# Patient Record
Sex: FEMALE | Race: WHITE | NOT HISPANIC OR LATINO
[De-identification: names, ages, dates, MRNs, and addresses within clinical notes are randomized per-mention and may not be internally consistent; named-entity substitution may affect disease eponyms.]

---

## 2021-10-15 ENCOUNTER — APPOINTMENT (OUTPATIENT)
Dept: FAMILY MEDICINE | Facility: CLINIC | Age: 31
End: 2021-10-15
Payer: COMMERCIAL

## 2021-10-15 VITALS
BODY MASS INDEX: 32.62 KG/M2 | TEMPERATURE: 98.2 F | HEART RATE: 103 BPM | WEIGHT: 203 LBS | SYSTOLIC BLOOD PRESSURE: 100 MMHG | OXYGEN SATURATION: 98 % | DIASTOLIC BLOOD PRESSURE: 52 MMHG | HEIGHT: 66 IN

## 2021-10-15 PROCEDURE — 99214 OFFICE O/P EST MOD 30 MIN: CPT | Mod: 25

## 2021-10-15 NOTE — HISTORY OF PRESENT ILLNESS
[FreeTextEntry1] : I UG [de-identified] : 30-year-old female  2 para 1 has a home positive pregnancy test

## 2021-10-15 NOTE — HEALTH RISK ASSESSMENT
[0] : 2) Feeling down, depressed, or hopeless: Not at all (0) [PHQ-2 Negative - No further assessment needed] : PHQ-2 Negative - No further assessment needed [[id number]] : 0

## 2021-10-15 NOTE — PLAN
[FreeTextEntry1] : 30-year-old female presents with presumptive pregnancy test.  She had positive readings at home\par  2 para 1\par A pregnancy urine test is obtained\par Overweight–diet and exercise are stressed during this in coming pregnancy\par Depression screen is read as negative

## 2021-10-16 ENCOUNTER — TRANSCRIPTION ENCOUNTER (OUTPATIENT)
Age: 31
End: 2021-10-16

## 2021-10-16 ENCOUNTER — APPOINTMENT (OUTPATIENT)
Dept: OBGYN | Facility: CLINIC | Age: 31
End: 2021-10-16
Payer: COMMERCIAL

## 2021-10-16 VITALS
BODY MASS INDEX: 32.4 KG/M2 | SYSTOLIC BLOOD PRESSURE: 110 MMHG | WEIGHT: 204 LBS | DIASTOLIC BLOOD PRESSURE: 78 MMHG | HEIGHT: 66.5 IN

## 2021-10-16 DIAGNOSIS — Z86.59 PERSONAL HISTORY OF OTHER MENTAL AND BEHAVIORAL DISORDERS: ICD-10-CM

## 2021-10-16 DIAGNOSIS — Z32.01 ENCOUNTER FOR PREGNANCY TEST, RESULT POSITIVE: ICD-10-CM

## 2021-10-16 PROCEDURE — 76830 TRANSVAGINAL US NON-OB: CPT

## 2021-10-16 PROCEDURE — 99204 OFFICE O/P NEW MOD 45 MIN: CPT

## 2021-10-16 NOTE — PLAN
[FreeTextEntry1] : pregnancy precautions reviewed. taking pnv. f/u 2 weeks for repeat sono or prn\par tvs- thickened endometrial lining

## 2021-10-18 LAB — HCG SERPL-MCNC: 39 MIU/ML

## 2021-10-28 ENCOUNTER — APPOINTMENT (OUTPATIENT)
Dept: OBGYN | Facility: CLINIC | Age: 31
End: 2021-10-28
Payer: COMMERCIAL

## 2021-10-28 VITALS
WEIGHT: 210 LBS | SYSTOLIC BLOOD PRESSURE: 155 MMHG | DIASTOLIC BLOOD PRESSURE: 85 MMHG | HEIGHT: 66.5 IN | BODY MASS INDEX: 33.35 KG/M2

## 2021-10-28 DIAGNOSIS — O21.9 VOMITING OF PREGNANCY, UNSPECIFIED: ICD-10-CM

## 2021-10-28 PROCEDURE — 99214 OFFICE O/P EST MOD 30 MIN: CPT

## 2021-10-28 PROCEDURE — 76830 TRANSVAGINAL US NON-OB: CPT

## 2021-10-28 NOTE — PLAN
[FreeTextEntry1] : pregnancy precautions reviewed\par dietary changes discussed\par f/u 1 week or prn\par tvs- ys/gs seen

## 2021-11-03 ENCOUNTER — EMERGENCY (EMERGENCY)
Facility: HOSPITAL | Age: 31
LOS: 1 days | End: 2021-11-03
Admitting: EMERGENCY MEDICINE
Payer: COMMERCIAL

## 2021-11-03 PROCEDURE — 93010 ELECTROCARDIOGRAM REPORT: CPT

## 2021-11-03 PROCEDURE — 99285 EMERGENCY DEPT VISIT HI MDM: CPT

## 2021-11-04 ENCOUNTER — APPOINTMENT (OUTPATIENT)
Dept: OBGYN | Facility: CLINIC | Age: 31
End: 2021-11-04
Payer: COMMERCIAL

## 2021-11-04 VITALS
BODY MASS INDEX: 33.75 KG/M2 | WEIGHT: 210 LBS | OXYGEN SATURATION: 98 % | DIASTOLIC BLOOD PRESSURE: 84 MMHG | RESPIRATION RATE: 18 BRPM | SYSTOLIC BLOOD PRESSURE: 133 MMHG | HEIGHT: 66 IN | HEART RATE: 101 BPM

## 2021-11-04 DIAGNOSIS — N92.6 IRREGULAR MENSTRUATION, UNSPECIFIED: ICD-10-CM

## 2021-11-04 PROCEDURE — 99214 OFFICE O/P EST MOD 30 MIN: CPT

## 2021-11-04 PROCEDURE — 76830 TRANSVAGINAL US NON-OB: CPT

## 2021-11-04 NOTE — PLAN
[FreeTextEntry1] : pregnancy precautions reviewed. taking pnv. f/u 4 weeks for nob or prn\par tvs- viable iup,+fh, 6+ weeks

## 2021-11-04 NOTE — HISTORY OF PRESENT ILLNESS
[FreeTextEntry1] : pt presents for f/u to missed period. was seen in er yesterday for "irregular heartbeat ad elevated bp" on 14yo bp cuff. pt had negative w/u and feels fine today

## 2021-12-01 ENCOUNTER — APPOINTMENT (OUTPATIENT)
Dept: ANTEPARTUM | Facility: CLINIC | Age: 31
End: 2021-12-01

## 2021-12-01 ENCOUNTER — APPOINTMENT (OUTPATIENT)
Dept: OBGYN | Facility: CLINIC | Age: 31
End: 2021-12-01

## 2021-12-07 ENCOUNTER — APPOINTMENT (OUTPATIENT)
Dept: OBGYN | Facility: CLINIC | Age: 31
End: 2021-12-07
Payer: COMMERCIAL

## 2021-12-07 VITALS
BODY MASS INDEX: 33.11 KG/M2 | DIASTOLIC BLOOD PRESSURE: 76 MMHG | WEIGHT: 206 LBS | HEIGHT: 66 IN | SYSTOLIC BLOOD PRESSURE: 118 MMHG

## 2021-12-07 PROCEDURE — 36415 COLL VENOUS BLD VENIPUNCTURE: CPT

## 2021-12-17 LAB
CLARIM 22Q11.2: NORMAL
CLARIM ADDITIONAL INFO: NORMAL
CLARIM CHROMOSOME 13: NORMAL
CLARIM CHROMOSOME 18: NORMAL
CLARIM CHROMOSOME 21: NORMAL
CLARIM SEX CHROMOSOMES: NORMAL
CLARITEST NIPT W/MICRO: NORMAL
MATERNAL WEIGHT (LBS):: 206
PLEASE INCLUDE GENDER RESULTS ON THIS REPORT:: NORMAL
TYPE OF PREGNANCY:: NORMAL

## 2021-12-21 ENCOUNTER — APPOINTMENT (OUTPATIENT)
Dept: ANTEPARTUM | Facility: CLINIC | Age: 31
End: 2021-12-21
Payer: COMMERCIAL

## 2021-12-21 ENCOUNTER — APPOINTMENT (OUTPATIENT)
Dept: OBGYN | Facility: CLINIC | Age: 31
End: 2021-12-21
Payer: COMMERCIAL

## 2021-12-21 ENCOUNTER — ASOB RESULT (OUTPATIENT)
Age: 31
End: 2021-12-21

## 2021-12-21 VITALS
DIASTOLIC BLOOD PRESSURE: 86 MMHG | BODY MASS INDEX: 32.47 KG/M2 | HEIGHT: 66 IN | WEIGHT: 202 LBS | SYSTOLIC BLOOD PRESSURE: 139 MMHG

## 2021-12-21 DIAGNOSIS — Z34.91 ENCOUNTER FOR SUPERVISION OF NORMAL PREGNANCY, UNSPECIFIED, FIRST TRIMESTER: ICD-10-CM

## 2021-12-21 PROCEDURE — 0500F INITIAL PRENATAL CARE VISIT: CPT

## 2021-12-21 PROCEDURE — 36416 COLLJ CAPILLARY BLOOD SPEC: CPT

## 2021-12-21 PROCEDURE — 76813 OB US NUCHAL MEAS 1 GEST: CPT

## 2021-12-22 LAB
ABO + RH PNL BLD: NORMAL
BASOPHILS # BLD AUTO: 0.02 K/UL
BASOPHILS NFR BLD AUTO: 0.2 %
BLD GP AB SCN SERPL QL: NORMAL
EOSINOPHIL # BLD AUTO: 0.09 K/UL
EOSINOPHIL NFR BLD AUTO: 0.9 %
HBV SURFACE AB SER QL: REACTIVE
HBV SURFACE AG SER QL: NONREACTIVE
HCT VFR BLD CALC: 34.1 %
HCV AB SER QL: NONREACTIVE
HCV S/CO RATIO: 0.38 S/CO
HGB A MFR BLD: 97.3 %
HGB A2 MFR BLD: 2.7 %
HGB BLD-MCNC: 11.8 G/DL
HGB FRACT BLD-IMP: NORMAL
HIV1+2 AB SPEC QL IA.RAPID: NONREACTIVE
IMM GRANULOCYTES NFR BLD AUTO: 0.5 %
LYMPHOCYTES # BLD AUTO: 1.3 K/UL
LYMPHOCYTES NFR BLD AUTO: 13.6 %
MAN DIFF?: NORMAL
MCHC RBC-ENTMCNC: 30.7 PG
MCHC RBC-ENTMCNC: 34.6 GM/DL
MCV RBC AUTO: 88.8 FL
MEV IGG FLD QL IA: 98.3 AU/ML
MEV IGG+IGM SER-IMP: POSITIVE
MONOCYTES # BLD AUTO: 0.64 K/UL
MONOCYTES NFR BLD AUTO: 6.7 %
NEUTROPHILS # BLD AUTO: 7.48 K/UL
NEUTROPHILS NFR BLD AUTO: 78.1 %
PLATELET # BLD AUTO: 229 K/UL
RBC # BLD: 3.84 M/UL
RBC # FLD: 11.9 %
RUBV IGG FLD-ACNC: 2 INDEX
RUBV IGG SER-IMP: POSITIVE
T GONDII AB SER-IMP: NEGATIVE
T GONDII AB SER-IMP: NEGATIVE
T GONDII IGG SER QL: <3 IU/ML
T GONDII IGM SER QL: 3.1 AU/ML
T PALLIDUM AB SER QL IA: NEGATIVE
VZV AB TITR SER: POSITIVE
VZV IGG SER IF-ACNC: 1351 INDEX
WBC # FLD AUTO: 9.58 K/UL

## 2021-12-23 LAB
B19V IGG SER QL IA: 1.44 INDEX
B19V IGG+IGM SER-IMP: NORMAL
B19V IGG+IGM SER-IMP: POSITIVE
B19V IGM FLD-ACNC: 0.09 INDEX
B19V IGM SER-ACNC: NEGATIVE

## 2022-01-11 ENCOUNTER — APPOINTMENT (OUTPATIENT)
Dept: OBGYN | Facility: CLINIC | Age: 32
End: 2022-01-11
Payer: COMMERCIAL

## 2022-01-11 VITALS
SYSTOLIC BLOOD PRESSURE: 146 MMHG | HEIGHT: 66 IN | BODY MASS INDEX: 32.47 KG/M2 | DIASTOLIC BLOOD PRESSURE: 82 MMHG | WEIGHT: 202 LBS

## 2022-01-11 DIAGNOSIS — Z34.92 ENCOUNTER FOR SUPERVISION OF NORMAL PREGNANCY, UNSPECIFIED, SECOND TRIMESTER: ICD-10-CM

## 2022-01-11 PROCEDURE — 0502F SUBSEQUENT PRENATAL CARE: CPT

## 2022-01-12 ENCOUNTER — NON-APPOINTMENT (OUTPATIENT)
Age: 32
End: 2022-01-12

## 2022-01-12 ENCOUNTER — APPOINTMENT (OUTPATIENT)
Dept: OBGYN | Facility: CLINIC | Age: 32
End: 2022-01-12
Payer: COMMERCIAL

## 2022-01-12 VITALS
HEIGHT: 66 IN | SYSTOLIC BLOOD PRESSURE: 124 MMHG | WEIGHT: 200 LBS | DIASTOLIC BLOOD PRESSURE: 60 MMHG | BODY MASS INDEX: 32.14 KG/M2

## 2022-01-12 DIAGNOSIS — Z29.13 ENCOUNTER FOR PROPHYLACTIC RHO(D) IMMUNE GLOBULIN: ICD-10-CM

## 2022-01-12 LAB
AR GENE MUT ANL BLD/T: NEGATIVE
CANDIDA VAG CYTO: DETECTED
CFTR MUT TESTED BLD/T: NEGATIVE
G VAGINALIS+PREV SP MTYP VAG QL MICRO: DETECTED
GENE DIS ANL CARRIER INTERP-IMP: POSITIVE
SMN1 GENE MUT ANL BLD/T: NORMAL
T VAGINALIS VAG QL WET PREP: NOT DETECTED

## 2022-01-12 PROCEDURE — 0502F SUBSEQUENT PRENATAL CARE: CPT

## 2022-01-12 PROCEDURE — 96372 THER/PROPH/DIAG INJ SC/IM: CPT

## 2022-01-12 PROCEDURE — 76705 ECHO EXAM OF ABDOMEN: CPT

## 2022-01-12 RX ADMIN — HUMAN RHO(D) IMMUNE GLOBULIN 0 UNIT: 300 INJECTION, SOLUTION INTRAMUSCULAR at 00:00

## 2022-01-13 ENCOUNTER — APPOINTMENT (OUTPATIENT)
Dept: OBGYN | Facility: CLINIC | Age: 32
End: 2022-01-13
Payer: COMMERCIAL

## 2022-01-13 VITALS
BODY MASS INDEX: 32.14 KG/M2 | OXYGEN SATURATION: 98 % | HEIGHT: 66 IN | RESPIRATION RATE: 18 BRPM | WEIGHT: 200 LBS | SYSTOLIC BLOOD PRESSURE: 141 MMHG | DIASTOLIC BLOOD PRESSURE: 81 MMHG

## 2022-01-13 PROCEDURE — 0502F SUBSEQUENT PRENATAL CARE: CPT

## 2022-01-14 LAB
2ND TRIMESTER DATA: NORMAL
AFP PNL SERPL: NORMAL
AFP SERPL-ACNC: NORMAL
CLINICAL BIOCHEMIST REVIEW: NORMAL
NOTES NTD: NORMAL

## 2022-01-18 ENCOUNTER — APPOINTMENT (OUTPATIENT)
Dept: OBGYN | Facility: CLINIC | Age: 32
End: 2022-01-18
Payer: COMMERCIAL

## 2022-01-18 VITALS
BODY MASS INDEX: 33.43 KG/M2 | HEIGHT: 66 IN | DIASTOLIC BLOOD PRESSURE: 83 MMHG | SYSTOLIC BLOOD PRESSURE: 146 MMHG | RESPIRATION RATE: 19 BRPM | OXYGEN SATURATION: 98 % | WEIGHT: 208 LBS

## 2022-01-18 DIAGNOSIS — N76.0 ACUTE VAGINITIS: ICD-10-CM

## 2022-01-18 PROCEDURE — 0502F SUBSEQUENT PRENATAL CARE: CPT

## 2022-02-09 ENCOUNTER — APPOINTMENT (OUTPATIENT)
Dept: OBGYN | Facility: CLINIC | Age: 32
End: 2022-02-09
Payer: COMMERCIAL

## 2022-02-09 ENCOUNTER — APPOINTMENT (OUTPATIENT)
Dept: ANTEPARTUM | Facility: CLINIC | Age: 32
End: 2022-02-09
Payer: COMMERCIAL

## 2022-02-09 ENCOUNTER — ASOB RESULT (OUTPATIENT)
Age: 32
End: 2022-02-09

## 2022-02-09 VITALS
HEART RATE: 88 BPM | SYSTOLIC BLOOD PRESSURE: 126 MMHG | RESPIRATION RATE: 18 BRPM | WEIGHT: 198 LBS | HEIGHT: 66 IN | BODY MASS INDEX: 31.82 KG/M2 | DIASTOLIC BLOOD PRESSURE: 68 MMHG

## 2022-02-09 VITALS — TEMPERATURE: 98.4 F

## 2022-02-09 PROCEDURE — 90715 TDAP VACCINE 7 YRS/> IM: CPT

## 2022-02-09 PROCEDURE — 90471 IMMUNIZATION ADMIN: CPT

## 2022-02-09 PROCEDURE — 76811 OB US DETAILED SNGL FETUS: CPT

## 2022-02-09 PROCEDURE — 0502F SUBSEQUENT PRENATAL CARE: CPT

## 2022-03-08 LAB
BILIRUB UR QL STRIP: NORMAL
CLARITY UR: CLEAR
COLLECTION METHOD: NORMAL
GLUCOSE UR-MCNC: NORMAL
HCG UR QL: 0.2 EU/DL
HGB UR QL STRIP.AUTO: NORMAL
KETONES UR-MCNC: NORMAL
LEUKOCYTE ESTERASE UR QL STRIP: NORMAL
NITRITE UR QL STRIP: NORMAL
PH UR STRIP: 6
PROT UR STRIP-MCNC: NORMAL
SP GR UR STRIP: 1.03

## 2022-03-10 ENCOUNTER — APPOINTMENT (OUTPATIENT)
Dept: OBGYN | Facility: CLINIC | Age: 32
End: 2022-03-10
Payer: COMMERCIAL

## 2022-03-10 VITALS
HEIGHT: 67 IN | WEIGHT: 201 LBS | SYSTOLIC BLOOD PRESSURE: 140 MMHG | DIASTOLIC BLOOD PRESSURE: 78 MMHG | BODY MASS INDEX: 31.55 KG/M2

## 2022-03-10 PROCEDURE — 0502F SUBSEQUENT PRENATAL CARE: CPT

## 2022-04-06 ENCOUNTER — ASOB RESULT (OUTPATIENT)
Age: 32
End: 2022-04-06

## 2022-04-06 ENCOUNTER — APPOINTMENT (OUTPATIENT)
Dept: OBGYN | Facility: CLINIC | Age: 32
End: 2022-04-06
Payer: COMMERCIAL

## 2022-04-06 ENCOUNTER — APPOINTMENT (OUTPATIENT)
Dept: ANTEPARTUM | Facility: CLINIC | Age: 32
End: 2022-04-06
Payer: COMMERCIAL

## 2022-04-06 ENCOUNTER — LABORATORY RESULT (OUTPATIENT)
Age: 32
End: 2022-04-06

## 2022-04-06 VITALS
DIASTOLIC BLOOD PRESSURE: 74 MMHG | HEIGHT: 67 IN | WEIGHT: 204 LBS | BODY MASS INDEX: 32.02 KG/M2 | SYSTOLIC BLOOD PRESSURE: 114 MMHG

## 2022-04-06 PROCEDURE — 36415 COLL VENOUS BLD VENIPUNCTURE: CPT

## 2022-04-06 PROCEDURE — 76816 OB US FOLLOW-UP PER FETUS: CPT

## 2022-04-06 PROCEDURE — 0502F SUBSEQUENT PRENATAL CARE: CPT

## 2022-04-08 LAB
BASOPHILS # BLD AUTO: 0.03 K/UL
BASOPHILS NFR BLD AUTO: 0.2 %
EOSINOPHIL # BLD AUTO: 0.17 K/UL
EOSINOPHIL NFR BLD AUTO: 1.3 %
GLUCOSE 1H P 100 G GLC PO SERPL-MCNC: 169 MG/DL
HCT VFR BLD CALC: 33 %
HGB BLD-MCNC: 10.7 G/DL
IMM GRANULOCYTES NFR BLD AUTO: 0.5 %
LYMPHOCYTES # BLD AUTO: 1.87 K/UL
LYMPHOCYTES NFR BLD AUTO: 14.6 %
MAN DIFF?: NORMAL
MCHC RBC-ENTMCNC: 30.5 PG
MCHC RBC-ENTMCNC: 32.4 GM/DL
MCV RBC AUTO: 94 FL
MONOCYTES # BLD AUTO: 0.69 K/UL
MONOCYTES NFR BLD AUTO: 5.4 %
NEUTROPHILS # BLD AUTO: 9.97 K/UL
NEUTROPHILS NFR BLD AUTO: 78 %
PLATELET # BLD AUTO: 281 K/UL
RBC # BLD: 3.51 M/UL
RBC # FLD: 13.1 %
WBC # FLD AUTO: 12.79 K/UL

## 2022-04-13 LAB
GLUCOSE 1H P 100 G GLC PO SERPL-MCNC: 137 MG/DL
GLUCOSE 2H P CHAL SERPL-MCNC: 124 MG/DL
GLUCOSE 3H P CHAL SERPL-MCNC: 47 MG/DL
GLUCOSE BS SERPL-MCNC: 77 MG/DL

## 2022-04-14 ENCOUNTER — NON-APPOINTMENT (OUTPATIENT)
Age: 32
End: 2022-04-14

## 2022-04-19 ENCOUNTER — NON-APPOINTMENT (OUTPATIENT)
Age: 32
End: 2022-04-19

## 2022-04-19 ENCOUNTER — APPOINTMENT (OUTPATIENT)
Dept: OBGYN | Facility: CLINIC | Age: 32
End: 2022-04-19
Payer: COMMERCIAL

## 2022-04-19 VITALS
BODY MASS INDEX: 31.71 KG/M2 | WEIGHT: 202 LBS | SYSTOLIC BLOOD PRESSURE: 130 MMHG | DIASTOLIC BLOOD PRESSURE: 70 MMHG | HEIGHT: 67 IN

## 2022-04-19 PROCEDURE — 0502F SUBSEQUENT PRENATAL CARE: CPT

## 2022-05-03 ENCOUNTER — APPOINTMENT (OUTPATIENT)
Dept: OBGYN | Facility: CLINIC | Age: 32
End: 2022-05-03
Payer: COMMERCIAL

## 2022-05-03 VITALS
BODY MASS INDEX: 31.86 KG/M2 | WEIGHT: 203 LBS | HEIGHT: 67 IN | SYSTOLIC BLOOD PRESSURE: 116 MMHG | DIASTOLIC BLOOD PRESSURE: 78 MMHG

## 2022-05-03 PROCEDURE — 0502F SUBSEQUENT PRENATAL CARE: CPT

## 2022-05-03 RX ORDER — HUMAN RHO(D) IMMUNE GLOBULIN 300 UG/1
1500 INJECTION, SOLUTION INTRAMUSCULAR
Qty: 0 | Refills: 0 | Status: COMPLETED | OUTPATIENT
Start: 2022-01-12

## 2022-05-17 ENCOUNTER — APPOINTMENT (OUTPATIENT)
Dept: OBGYN | Facility: CLINIC | Age: 32
End: 2022-05-17
Payer: COMMERCIAL

## 2022-05-17 VITALS
DIASTOLIC BLOOD PRESSURE: 74 MMHG | SYSTOLIC BLOOD PRESSURE: 122 MMHG | HEIGHT: 67 IN | BODY MASS INDEX: 31.23 KG/M2 | WEIGHT: 199 LBS

## 2022-05-17 PROCEDURE — 0502F SUBSEQUENT PRENATAL CARE: CPT

## 2022-05-25 ENCOUNTER — APPOINTMENT (OUTPATIENT)
Dept: ANTEPARTUM | Facility: CLINIC | Age: 32
End: 2022-05-25
Payer: COMMERCIAL

## 2022-05-25 ENCOUNTER — APPOINTMENT (OUTPATIENT)
Dept: OBGYN | Facility: CLINIC | Age: 32
End: 2022-05-25
Payer: COMMERCIAL

## 2022-05-25 ENCOUNTER — APPOINTMENT (OUTPATIENT)
Dept: ANTEPARTUM | Facility: CLINIC | Age: 32
End: 2022-05-25

## 2022-05-25 ENCOUNTER — ASOB RESULT (OUTPATIENT)
Age: 32
End: 2022-05-25

## 2022-05-25 VITALS
SYSTOLIC BLOOD PRESSURE: 128 MMHG | WEIGHT: 201 LBS | BODY MASS INDEX: 31.55 KG/M2 | HEIGHT: 67 IN | DIASTOLIC BLOOD PRESSURE: 68 MMHG

## 2022-05-25 DIAGNOSIS — Z34.93 ENCOUNTER FOR SUPERVISION OF NORMAL PREGNANCY, UNSPECIFIED, THIRD TRIMESTER: ICD-10-CM

## 2022-05-25 PROCEDURE — 76819 FETAL BIOPHYS PROFIL W/O NST: CPT

## 2022-05-25 PROCEDURE — 0502F SUBSEQUENT PRENATAL CARE: CPT

## 2022-05-25 PROCEDURE — 76816 OB US FOLLOW-UP PER FETUS: CPT

## 2022-05-31 ENCOUNTER — APPOINTMENT (OUTPATIENT)
Dept: OBGYN | Facility: CLINIC | Age: 32
End: 2022-05-31
Payer: COMMERCIAL

## 2022-05-31 VITALS
SYSTOLIC BLOOD PRESSURE: 120 MMHG | DIASTOLIC BLOOD PRESSURE: 84 MMHG | BODY MASS INDEX: 31.55 KG/M2 | WEIGHT: 201 LBS | HEIGHT: 67 IN

## 2022-05-31 LAB
GP B STREP DNA SPEC QL NAA+PROBE: NORMAL
GP B STREP DNA SPEC QL NAA+PROBE: NOT DETECTED
SOURCE GBS: NORMAL

## 2022-05-31 PROCEDURE — 0502F SUBSEQUENT PRENATAL CARE: CPT

## 2022-06-07 ENCOUNTER — APPOINTMENT (OUTPATIENT)
Dept: OBGYN | Facility: CLINIC | Age: 32
End: 2022-06-07
Payer: COMMERCIAL

## 2022-06-07 VITALS
WEIGHT: 201 LBS | HEIGHT: 67 IN | BODY MASS INDEX: 31.55 KG/M2 | SYSTOLIC BLOOD PRESSURE: 130 MMHG | DIASTOLIC BLOOD PRESSURE: 78 MMHG

## 2022-06-07 PROCEDURE — 0502F SUBSEQUENT PRENATAL CARE: CPT

## 2022-06-14 ENCOUNTER — APPOINTMENT (OUTPATIENT)
Dept: OBGYN | Facility: CLINIC | Age: 32
End: 2022-06-14

## 2022-06-14 VITALS
BODY MASS INDEX: 31.55 KG/M2 | DIASTOLIC BLOOD PRESSURE: 90 MMHG | WEIGHT: 201 LBS | HEIGHT: 67 IN | SYSTOLIC BLOOD PRESSURE: 130 MMHG

## 2022-06-20 ENCOUNTER — APPOINTMENT (OUTPATIENT)
Dept: OBGYN | Facility: HOSPITAL | Age: 32
End: 2022-06-20

## 2022-06-20 ENCOUNTER — INPATIENT (INPATIENT)
Facility: HOSPITAL | Age: 32
LOS: 0 days | Discharge: HOME | End: 2022-06-21
Attending: OBSTETRICS & GYNECOLOGY | Admitting: OBSTETRICS & GYNECOLOGY
Payer: COMMERCIAL

## 2022-06-20 PROCEDURE — 59400 OBSTETRICAL CARE: CPT

## 2022-06-20 PROCEDURE — 88307 TISSUE EXAM BY PATHOLOGIST: CPT | Mod: 26

## 2022-06-30 DIAGNOSIS — Z20.822 CONTACT WITH AND (SUSPECTED) EXPOSURE TO COVID-19: ICD-10-CM

## 2022-06-30 DIAGNOSIS — Z3A.39 39 WEEKS GESTATION OF PREGNANCY: ICD-10-CM

## 2022-07-06 ENCOUNTER — NON-APPOINTMENT (OUTPATIENT)
Age: 32
End: 2022-07-06

## 2022-08-04 ENCOUNTER — APPOINTMENT (OUTPATIENT)
Dept: OBGYN | Facility: CLINIC | Age: 32
End: 2022-08-04

## 2022-08-04 VITALS
WEIGHT: 187 LBS | DIASTOLIC BLOOD PRESSURE: 82 MMHG | HEIGHT: 67 IN | BODY MASS INDEX: 29.35 KG/M2 | SYSTOLIC BLOOD PRESSURE: 122 MMHG

## 2022-08-04 NOTE — HISTORY OF PRESENT ILLNESS
[Postpartum Follow Up] : postpartum follow up [Complications:___] : no complications [] : delivered by vaginal delivery [Male] : Delivery History: baby boy [Rhogam] : Rhogam administered [Breastfeeding] : not currently nursing [Intended Contraception] : Intended Contraception: [IUD] : intrauterine device [Back to Normal] : is back to normal in size [None] : no vaginal bleeding [Not Done] : Examination of breasts not done [Doing Well] : is doing well [No Sign of Infection] : is showing no signs of infection [Excellent Pain Control] : has excellent pain control [de-identified] : pt requesting iud. r/b/a advised. will schedule. f/u 2 mos for annual

## 2022-08-15 ENCOUNTER — NON-APPOINTMENT (OUTPATIENT)
Age: 32
End: 2022-08-15

## 2022-08-19 ENCOUNTER — APPOINTMENT (OUTPATIENT)
Dept: FAMILY MEDICINE | Facility: CLINIC | Age: 32
End: 2022-08-19

## 2022-08-19 ENCOUNTER — RESULT CHARGE (OUTPATIENT)
Age: 32
End: 2022-08-19

## 2022-08-19 ENCOUNTER — NON-APPOINTMENT (OUTPATIENT)
Age: 32
End: 2022-08-19

## 2022-08-19 VITALS
WEIGHT: 192 LBS | DIASTOLIC BLOOD PRESSURE: 78 MMHG | BODY MASS INDEX: 30.13 KG/M2 | OXYGEN SATURATION: 98 % | HEIGHT: 67 IN | SYSTOLIC BLOOD PRESSURE: 120 MMHG | HEART RATE: 92 BPM | RESPIRATION RATE: 15 BRPM | TEMPERATURE: 97.3 F

## 2022-08-19 VITALS
OXYGEN SATURATION: 98 % | DIASTOLIC BLOOD PRESSURE: 78 MMHG | HEIGHT: 67 IN | HEART RATE: 92 BPM | TEMPERATURE: 97.3 F | SYSTOLIC BLOOD PRESSURE: 120 MMHG | BODY MASS INDEX: 30.13 KG/M2 | WEIGHT: 192 LBS | RESPIRATION RATE: 15 BRPM

## 2022-08-19 DIAGNOSIS — Z33.1 PREGNANT STATE, INCIDENTAL: ICD-10-CM

## 2022-08-19 DIAGNOSIS — G47.9 SLEEP DISORDER, UNSPECIFIED: ICD-10-CM

## 2022-08-19 PROCEDURE — 93000 ELECTROCARDIOGRAM COMPLETE: CPT

## 2022-08-19 PROCEDURE — 99395 PREV VISIT EST AGE 18-39: CPT | Mod: 25

## 2022-08-19 PROCEDURE — 99406 BEHAV CHNG SMOKING 3-10 MIN: CPT

## 2022-08-19 PROCEDURE — 81003 URINALYSIS AUTO W/O SCOPE: CPT | Mod: QW

## 2022-08-24 PROBLEM — G47.9 SLEEP DISORDER: Status: ACTIVE | Noted: 2022-08-24

## 2022-08-24 NOTE — PLAN
[FreeTextEntry1] : 31-year-old female presents for physical examination\par /social drinker\par Tobacco user–postpartum continues to smoke.  Not breast-feeding.  She understands the importance of cessation from tobacco and is willing to taper\par Sleep disorder– baby having trouble with child sleep pattern is affecting her secondarily\par Postpartum–doing well postpartum lab work is drawn for complete examination\par Major depressive disorder–has been on antianxiolytic medication in the past not currently on medication\par EKG shows normal sinus rhythm without incident\par Lab work urinalysis are drawn

## 2022-08-24 NOTE — HISTORY OF PRESENT ILLNESS
[FreeTextEntry1] : physical exam  [de-identified] : sleeping issues \par tob + 1/2 \par no breast feeding \par lexapro \par klonipramine \par xanax

## 2022-08-24 NOTE — COUNSELING
[Behavioral health counseling provided] : Behavioral health counseling provided [FreeTextEntry1] : 4

## 2022-08-24 NOTE — HEALTH RISK ASSESSMENT
[0] : 2) Feeling down, depressed, or hopeless: Not at all (0) [PHQ-2 Negative - No further assessment needed] : PHQ-2 Negative - No further assessment needed [[id number]] : 0

## 2022-08-25 LAB
ALBUMIN SERPL ELPH-MCNC: 4.6 G/DL
ALP BLD-CCNC: 78 U/L
ALT SERPL-CCNC: 15 U/L
ANION GAP SERPL CALC-SCNC: 12 MMOL/L
AST SERPL-CCNC: 15 U/L
BASOPHILS # BLD AUTO: 0.03 K/UL
BASOPHILS NFR BLD AUTO: 0.3 %
BILIRUB SERPL-MCNC: 0.3 MG/DL
BILIRUB UR QL STRIP: NEGATIVE
BUN SERPL-MCNC: 12 MG/DL
CALCIUM SERPL-MCNC: 9.6 MG/DL
CHLORIDE SERPL-SCNC: 104 MMOL/L
CHOLEST SERPL-MCNC: 220 MG/DL
CLARITY UR: CLEAR
CO2 SERPL-SCNC: 23 MMOL/L
COLLECTION METHOD: NORMAL
CREAT SERPL-MCNC: 0.8 MG/DL
EGFR: 100 ML/MIN/1.73M2
EOSINOPHIL # BLD AUTO: 0.28 K/UL
EOSINOPHIL NFR BLD AUTO: 2.4 %
ESTIMATED AVERAGE GLUCOSE: 103 MG/DL
FERRITIN SERPL-MCNC: 14 NG/ML
GLUCOSE SERPL-MCNC: 89 MG/DL
GLUCOSE UR-MCNC: NEGATIVE
HBA1C MFR BLD HPLC: 5.2 %
HCG UR QL: 0.2 EU/DL
HCT VFR BLD CALC: 39.7 %
HDLC SERPL-MCNC: 52 MG/DL
HGB BLD-MCNC: 12.7 G/DL
HGB UR QL STRIP.AUTO: NORMAL
IMM GRANULOCYTES NFR BLD AUTO: 0.3 %
IRON SATN MFR SERPL: 17 %
IRON SERPL-MCNC: 60 UG/DL
KETONES UR-MCNC: NEGATIVE
LDLC SERPL CALC-MCNC: 144 MG/DL
LEUKOCYTE ESTERASE UR QL STRIP: NEGATIVE
LYMPHOCYTES # BLD AUTO: 2.29 K/UL
LYMPHOCYTES NFR BLD AUTO: 19.9 %
MAN DIFF?: NORMAL
MCHC RBC-ENTMCNC: 29.8 PG
MCHC RBC-ENTMCNC: 32 GM/DL
MCV RBC AUTO: 93.2 FL
MONOCYTES # BLD AUTO: 0.64 K/UL
MONOCYTES NFR BLD AUTO: 5.6 %
NEUTROPHILS # BLD AUTO: 8.24 K/UL
NEUTROPHILS NFR BLD AUTO: 71.5 %
NITRITE UR QL STRIP: NEGATIVE
NONHDLC SERPL-MCNC: 168 MG/DL
PH UR STRIP: 7
PLATELET # BLD AUTO: 346 K/UL
POTASSIUM SERPL-SCNC: 4.7 MMOL/L
PROT SERPL-MCNC: 7 G/DL
PROT UR STRIP-MCNC: NEGATIVE
RBC # BLD: 4.26 M/UL
RBC # FLD: 13.9 %
SODIUM SERPL-SCNC: 140 MMOL/L
SP GR UR STRIP: 1.01
TIBC SERPL-MCNC: 359 UG/DL
TRIGL SERPL-MCNC: 122 MG/DL
TSH SERPL-ACNC: 1.38 UIU/ML
UIBC SERPL-MCNC: 299 UG/DL
WBC # FLD AUTO: 11.51 K/UL

## 2022-12-02 ENCOUNTER — RESULT CHARGE (OUTPATIENT)
Age: 32
End: 2022-12-02

## 2022-12-02 ENCOUNTER — APPOINTMENT (OUTPATIENT)
Dept: FAMILY MEDICINE | Facility: CLINIC | Age: 32
End: 2022-12-02
Payer: SELF-PAY

## 2022-12-02 VITALS
BODY MASS INDEX: 31.71 KG/M2 | TEMPERATURE: 97.8 F | HEART RATE: 112 BPM | OXYGEN SATURATION: 98 % | RESPIRATION RATE: 15 BRPM | SYSTOLIC BLOOD PRESSURE: 110 MMHG | HEIGHT: 67 IN | WEIGHT: 202 LBS | DIASTOLIC BLOOD PRESSURE: 60 MMHG

## 2022-12-02 DIAGNOSIS — U07.1 COVID-19: ICD-10-CM

## 2022-12-02 DIAGNOSIS — J31.0 CHRONIC RHINITIS: ICD-10-CM

## 2022-12-02 DIAGNOSIS — J32.9 CHRONIC RHINITIS: ICD-10-CM

## 2022-12-02 PROCEDURE — 99214 OFFICE O/P EST MOD 30 MIN: CPT | Mod: 25

## 2022-12-02 PROCEDURE — 87880 STREP A ASSAY W/OPTIC: CPT | Mod: QW

## 2022-12-02 RX ORDER — ALPRAZOLAM 0.25 MG/1
0.25 TABLET ORAL
Qty: 30 | Refills: 0 | Status: ACTIVE | COMMUNITY
Start: 2022-11-07

## 2022-12-02 RX ORDER — ESCITALOPRAM OXALATE 20 MG/1
20 TABLET ORAL
Qty: 90 | Refills: 0 | Status: ACTIVE | COMMUNITY
Start: 2022-10-26

## 2022-12-02 RX ORDER — CLOMIPRAMINE HYDROCHLORIDE 25 MG/1
25 CAPSULE ORAL
Qty: 30 | Refills: 0 | Status: ACTIVE | COMMUNITY
Start: 2022-09-20

## 2022-12-03 NOTE — HEALTH RISK ASSESSMENT
[0] : 2) Feeling down, depressed, or hopeless: Not at all (0) [PHQ-2 Negative - No further assessment needed] : PHQ-2 Negative - No further assessment needed [[id number]] : 0

## 2022-12-03 NOTE — PLAN
[FreeTextEntry1] : 32-year-old female presents for evaluation\par COVID-19–she tested positive for COVID she has had 6 days of upper respiratory symptoms.  Slight improvement\par Rhinosinusitis–her symptoms have migrated to the throat and sinus region complaining of a sore throat and cough.  Her rapid strep test is negative\par A throat culture is sent out\par Empiric doxycycline to 100 mg twice daily for 10, along with Tessalon Perles as ordered\par Major depressive disorder–she is done well with Xanax 0.25 as needed and Lexapro 20 mg daily

## 2022-12-05 LAB
BACTERIA THROAT CULT: NORMAL
S PYO AG SPEC QL IA: NEGATIVE

## 2022-12-30 ENCOUNTER — APPOINTMENT (OUTPATIENT)
Dept: FAMILY MEDICINE | Facility: CLINIC | Age: 32
End: 2022-12-30
Payer: COMMERCIAL

## 2022-12-30 VITALS
WEIGHT: 202 LBS | DIASTOLIC BLOOD PRESSURE: 84 MMHG | OXYGEN SATURATION: 99 % | HEIGHT: 67 IN | HEART RATE: 117 BPM | SYSTOLIC BLOOD PRESSURE: 122 MMHG | BODY MASS INDEX: 31.71 KG/M2 | TEMPERATURE: 95.8 F

## 2022-12-30 DIAGNOSIS — F32.9 MAJOR DEPRESSIVE DISORDER, SINGLE EPISODE, UNSPECIFIED: ICD-10-CM

## 2022-12-30 DIAGNOSIS — F41.9 ANXIETY DISORDER, UNSPECIFIED: ICD-10-CM

## 2022-12-30 PROCEDURE — 99214 OFFICE O/P EST MOD 30 MIN: CPT

## 2022-12-31 ENCOUNTER — RX CHANGE (OUTPATIENT)
Age: 32
End: 2022-12-31

## 2022-12-31 RX ORDER — TIRZEPATIDE 2.5 MG/.5ML
2.5 INJECTION, SOLUTION SUBCUTANEOUS WEEKLY
Qty: 6.5 | Refills: 0 | Status: DISCONTINUED | COMMUNITY
Start: 2022-12-30 | End: 2022-12-31

## 2023-01-15 NOTE — PLAN
. [FreeTextEntry1] : 32-year-old female presents for evaluation\par 20 pound weight gain\par Weight gain–admits to 20 pound weight gain over the last 4 months consideration is given to [facility].  However costs are prohibitive.  Her hemoglobin A1c is 5.2.  Diet and exercise are discussed at length\par Major depressive disorder–Lexapro 20 mg daily\par Anxiety disorder/panic–she uses Xanax 0.25 mg on an as-needed basis

## 2023-01-15 NOTE — HEALTH RISK ASSESSMENT
[0] : 2) Feeling down, depressed, or hopeless: Not at all (0) [PHQ-2 Negative - No further assessment needed] : PHQ-2 Negative - No further assessment needed [[id number]] : 0

## 2023-01-15 NOTE — HISTORY OF PRESENT ILLNESS
[FreeTextEntry1] : meds  [de-identified] : med renewal \par wt up 20 x 4 mos \par panic mdd anxiety \par \par

## 2023-03-24 ENCOUNTER — APPOINTMENT (OUTPATIENT)
Dept: FAMILY MEDICINE | Facility: CLINIC | Age: 33
End: 2023-03-24

## 2023-04-28 RX ORDER — SEMAGLUTIDE 0.25 MG/.5ML
0.25 INJECTION, SOLUTION SUBCUTANEOUS
Qty: 1 | Refills: 0 | Status: DISCONTINUED | COMMUNITY
Start: 2023-03-31 | End: 2023-04-28

## 2023-04-28 RX ORDER — ORAL SEMAGLUTIDE 3 MG/1
3 TABLET ORAL
Qty: 90 | Refills: 0 | Status: DISCONTINUED | COMMUNITY
Start: 2023-04-03 | End: 2023-04-28

## 2023-04-28 RX ORDER — TIRZEPATIDE 2.5 MG/.5ML
2.5 INJECTION, SOLUTION SUBCUTANEOUS
Qty: 13 | Refills: 0 | Status: DISCONTINUED | COMMUNITY
Start: 2022-12-31 | End: 2023-04-28

## 2023-05-24 ENCOUNTER — RX CHANGE (OUTPATIENT)
Age: 33
End: 2023-05-24

## 2023-05-24 ENCOUNTER — APPOINTMENT (OUTPATIENT)
Dept: FAMILY MEDICINE | Facility: CLINIC | Age: 33
End: 2023-05-24
Payer: COMMERCIAL

## 2023-05-24 VITALS
HEIGHT: 67 IN | HEART RATE: 110 BPM | RESPIRATION RATE: 16 BRPM | OXYGEN SATURATION: 98 % | BODY MASS INDEX: 32.96 KG/M2 | WEIGHT: 210 LBS | DIASTOLIC BLOOD PRESSURE: 80 MMHG | TEMPERATURE: 99.2 F | SYSTOLIC BLOOD PRESSURE: 126 MMHG

## 2023-05-24 PROCEDURE — 99213 OFFICE O/P EST LOW 20 MIN: CPT

## 2023-05-24 NOTE — HISTORY OF PRESENT ILLNESS
[FreeTextEntry8] : Patient presents for rash to bilateral arms and legs. Rash resembles insect bites, redness and purulent drainage from area noted on right lower leg. Symptoms have been going on for a few days. They have been worsening. She does work outside on occasion. Denies fever, body aches, known insect or tick bites. Denies others in the household with similar symptoms.

## 2023-05-24 NOTE — PLAN
[FreeTextEntry1] : Consulted Dr. Graham about wounds noted on patient. Appears to be staph, prescribed keflex qid for 7 days. If symptoms are not improving in the first few days- contact office. \par \par Educated to go to ER if rash is worsening, fever or body aches occur.

## 2023-05-26 ENCOUNTER — APPOINTMENT (OUTPATIENT)
Dept: FAMILY MEDICINE | Facility: CLINIC | Age: 33
End: 2023-05-26
Payer: COMMERCIAL

## 2023-05-26 VITALS
OXYGEN SATURATION: 97 % | HEIGHT: 67 IN | BODY MASS INDEX: 32.96 KG/M2 | HEART RATE: 118 BPM | DIASTOLIC BLOOD PRESSURE: 70 MMHG | TEMPERATURE: 97.8 F | RESPIRATION RATE: 16 BRPM | WEIGHT: 210 LBS | SYSTOLIC BLOOD PRESSURE: 120 MMHG

## 2023-05-26 DIAGNOSIS — E88.81 METABOLIC SYNDROME: ICD-10-CM

## 2023-05-26 DIAGNOSIS — L25.5 UNSPECIFIED CONTACT DERMATITIS DUE TO PLANTS, EXCEPT FOOD: ICD-10-CM

## 2023-05-26 DIAGNOSIS — E66.3 OVERWEIGHT: ICD-10-CM

## 2023-05-26 PROCEDURE — 99214 OFFICE O/P EST MOD 30 MIN: CPT | Mod: 25

## 2023-05-26 PROCEDURE — 96372 THER/PROPH/DIAG INJ SC/IM: CPT

## 2023-05-26 RX ORDER — DEXAMETHASONE SODIUM PHOSPHATE 4 MG/ML
4 INJECTION, SOLUTION INTRAMUSCULAR; INTRAVENOUS
Qty: 0 | Refills: 0 | Status: COMPLETED | OUTPATIENT
Start: 2023-05-26

## 2023-05-26 RX ADMIN — DEXAMETHASONE SODIUM PHOSPHATE 1 MG/ML: 4 INJECTION, SOLUTION INTRAMUSCULAR; INTRAVENOUS at 00:00

## 2023-06-09 ENCOUNTER — LABORATORY RESULT (OUTPATIENT)
Age: 33
End: 2023-06-09

## 2023-06-09 ENCOUNTER — APPOINTMENT (OUTPATIENT)
Dept: FAMILY MEDICINE | Facility: CLINIC | Age: 33
End: 2023-06-09
Payer: COMMERCIAL

## 2023-06-09 VITALS
DIASTOLIC BLOOD PRESSURE: 79 MMHG | HEART RATE: 121 BPM | HEIGHT: 67 IN | TEMPERATURE: 97.8 F | SYSTOLIC BLOOD PRESSURE: 130 MMHG | BODY MASS INDEX: 32.96 KG/M2 | OXYGEN SATURATION: 97 % | WEIGHT: 210 LBS
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
## 2023-06-09 DIAGNOSIS — Z13.1 ENCOUNTER FOR SCREENING FOR DIABETES MELLITUS: ICD-10-CM

## 2023-06-09 DIAGNOSIS — S30.860A INSECT BITE (NONVENOMOUS) OF LOWER BACK AND PELVIS, INITIAL ENCOUNTER: ICD-10-CM

## 2023-06-09 DIAGNOSIS — Z71.6 TOBACCO ABUSE COUNSELING: ICD-10-CM

## 2023-06-09 DIAGNOSIS — L03.119 CELLULITIS OF UNSPECIFIED PART OF LIMB: ICD-10-CM

## 2023-06-09 PROCEDURE — 99214 OFFICE O/P EST MOD 30 MIN: CPT | Mod: 25

## 2023-06-09 PROCEDURE — 36415 COLL VENOUS BLD VENIPUNCTURE: CPT

## 2023-06-09 RX ORDER — NICOTINE 21 MG/24H
21 PATCH, EXTENDED RELEASE TRANSDERMAL DAILY
Qty: 1 | Refills: 1 | Status: ACTIVE | COMMUNITY
Start: 2023-06-09 | End: 1900-01-01

## 2023-06-09 NOTE — HISTORY OF PRESENT ILLNESS
[FreeTextEntry8] : Patient presents for continued itching and rash. Symptoms improved with oral/topical steroids and keflex. Then after treatment was finished symptoms worsened. Patient states she was bitten by a tick on her back recently. She denies fevers, joint pain or other symptoms. She denies previous history of tick illnesses.

## 2023-06-09 NOTE — PLAN
[FreeTextEntry1] : Tick bite- tick panel ordered, no previous tick illnesses.\par \par Skin rash- abx changed to doxycycline to to previous tick bite. Patient will restart triamcinolone cream and prednisone. \par \par f/u in 10 days if symptoms continue,

## 2023-06-12 LAB
A PHAGOCYTOPH IGG TITR SER IF: NORMAL TITER
ALBUMIN SERPL ELPH-MCNC: 4.5 G/DL
ALP BLD-CCNC: 71 U/L
ALT SERPL-CCNC: 12 U/L
ANION GAP SERPL CALC-SCNC: 14 MMOL/L
AST SERPL-CCNC: 14 U/L
B BURGDOR AB SER QL IA: NEGATIVE
B MICROTI IGG TITR SER: NORMAL TITER
BASOPHILS # BLD AUTO: 0.03 K/UL
BASOPHILS NFR BLD AUTO: 0.3 %
BILIRUB SERPL-MCNC: 0.2 MG/DL
BUN SERPL-MCNC: 11 MG/DL
CALCIUM SERPL-MCNC: 9.4 MG/DL
CHLORIDE SERPL-SCNC: 105 MMOL/L
CO2 SERPL-SCNC: 20 MMOL/L
CREAT SERPL-MCNC: 0.69 MG/DL
E CHAFFEENSIS IGG TITR SER IF: NORMAL TITER
EGFR: 117 ML/MIN/1.73M2
EOSINOPHIL # BLD AUTO: 0.22 K/UL
EOSINOPHIL NFR BLD AUTO: 1.9 %
ESTIMATED AVERAGE GLUCOSE: 111 MG/DL
GLUCOSE SERPL-MCNC: 101 MG/DL
HBA1C MFR BLD HPLC: 5.5 %
HCT VFR BLD CALC: 40.8 %
HGB BLD-MCNC: 13.3 G/DL
IMM GRANULOCYTES NFR BLD AUTO: 0.3 %
LYMPHOCYTES # BLD AUTO: 2.16 K/UL
LYMPHOCYTES NFR BLD AUTO: 18.6 %
MAN DIFF?: NORMAL
MCHC RBC-ENTMCNC: 29.7 PG
MCHC RBC-ENTMCNC: 32.6 GM/DL
MCV RBC AUTO: 91.1 FL
MONOCYTES # BLD AUTO: 0.71 K/UL
MONOCYTES NFR BLD AUTO: 6.1 %
NEUTROPHILS # BLD AUTO: 8.43 K/UL
NEUTROPHILS NFR BLD AUTO: 72.8 %
PLATELET # BLD AUTO: 268 K/UL
POTASSIUM SERPL-SCNC: 4.1 MMOL/L
PROT SERPL-MCNC: 6.9 G/DL
RBC # BLD: 4.48 M/UL
RBC # FLD: 13.1 %
SODIUM SERPL-SCNC: 139 MMOL/L
WBC # FLD AUTO: 11.59 K/UL

## 2023-07-05 PROBLEM — E88.81 METABOLIC SYNDROME: Status: ACTIVE | Noted: 2023-07-05

## 2023-07-05 PROBLEM — E66.3 OVERWEIGHT: Status: ACTIVE | Noted: 2021-10-15

## 2023-07-05 NOTE — PLAN
[FreeTextEntry1] : 32-year-old female, accompanied by her 2 children, presents for evaluation\par Erysipelas–seen the other day she was started on Keflex 500 mg 4 times daily for 10 days.  The rash has gotten worse lumps instead of improvement\par Susan dermatitis–she admits to 4 days prior to the onset of the rash she was pulling out weeds in her backyard.  Did not see any specific Susan plants the rash could be consistent with allergic dermatitis\par Dexamethasone 4 mg IM is administered\par Followed by prednisone 50 mg taper over 7 days\par Hypertension–120/70 this a 5 feet 7 inches 210 pound female with a BMI greater than 30\par Metabolic syndrome–5 foot 7 inch 210 pound female B BMI greater than 32\par Diet and exercise are discussed as the cornerstone of the treatment of the diseases of metabolic syndrome\par Glucose intolerance–periodic elevations in blood sugar with positive family history\par Ozempic intramuscular injections on a weekly basis trial at starter dose

## 2023-07-11 ENCOUNTER — APPOINTMENT (OUTPATIENT)
Dept: OBGYN | Facility: CLINIC | Age: 33
End: 2023-07-11
Payer: COMMERCIAL

## 2023-07-11 ENCOUNTER — TRANSCRIPTION ENCOUNTER (OUTPATIENT)
Age: 33
End: 2023-07-11

## 2023-07-11 VITALS
WEIGHT: 210 LBS | SYSTOLIC BLOOD PRESSURE: 110 MMHG | BODY MASS INDEX: 32.96 KG/M2 | DIASTOLIC BLOOD PRESSURE: 72 MMHG | HEIGHT: 67 IN

## 2023-07-11 DIAGNOSIS — N92.6 IRREGULAR MENSTRUATION, UNSPECIFIED: ICD-10-CM

## 2023-07-11 DIAGNOSIS — Z01.419 ENCOUNTER FOR GYNECOLOGICAL EXAMINATION (GENERAL) (ROUTINE) W/OUT ABNORMAL FINDINGS: ICD-10-CM

## 2023-07-11 PROCEDURE — 99395 PREV VISIT EST AGE 18-39: CPT

## 2023-07-11 NOTE — HISTORY OF PRESENT ILLNESS
[FreeTextEntry1] : pt presents for annual doing well. c/o bloody nipple d/c. also c/o irregular and heavy periods

## 2023-07-11 NOTE — PLAN
[FreeTextEntry1] : Pap and cultures to lab. SBE encouraged\par diagnostic mammo/sono slip given. advised of importance of f/u to r/o cancer. breast specialist given\par pcos panel sent/blood work sent

## 2023-07-12 LAB
C TRACH RRNA SPEC QL NAA+PROBE: NOT DETECTED
ESTRADIOL SERPL-MCNC: 59 PG/ML
FSH SERPL-MCNC: 6.8 IU/L
HPV HIGH+LOW RISK DNA PNL CVX: NOT DETECTED
LH SERPL-ACNC: 12.6 IU/L
N GONORRHOEA RRNA SPEC QL NAA+PROBE: NOT DETECTED
PROLACTIN SERPL-MCNC: 13.3 NG/ML
SOURCE TP AMPLIFICATION: NORMAL
TESTOST SERPL-MCNC: 45.6 NG/DL
TSH SERPL-ACNC: 2.03 UIU/ML

## 2023-07-14 LAB — CYTOLOGY CVX/VAG DOC THIN PREP: NORMAL

## 2023-07-17 LAB — SHBG-ESOTERIX: 44.3 NMOL/L

## 2023-07-18 ENCOUNTER — APPOINTMENT (OUTPATIENT)
Dept: ULTRASOUND IMAGING | Facility: CLINIC | Age: 33
End: 2023-07-18

## 2023-07-18 ENCOUNTER — OUTPATIENT (OUTPATIENT)
Dept: OUTPATIENT SERVICES | Facility: HOSPITAL | Age: 33
LOS: 1 days | End: 2023-07-18

## 2023-07-18 ENCOUNTER — APPOINTMENT (OUTPATIENT)
Dept: MAMMOGRAPHY | Facility: CLINIC | Age: 33
End: 2023-07-18

## 2023-07-18 DIAGNOSIS — N64.52 NIPPLE DISCHARGE: ICD-10-CM

## 2023-07-18 LAB — DHEA-SULFATE, SERUM: 261 UG/DL

## 2023-07-24 ENCOUNTER — APPOINTMENT (OUTPATIENT)
Dept: BREAST CENTER | Facility: CLINIC | Age: 33
End: 2023-07-24
Payer: COMMERCIAL

## 2023-07-24 VITALS
HEART RATE: 101 BPM | HEIGHT: 67 IN | WEIGHT: 210 LBS | TEMPERATURE: 97.5 F | DIASTOLIC BLOOD PRESSURE: 85 MMHG | SYSTOLIC BLOOD PRESSURE: 129 MMHG | BODY MASS INDEX: 32.96 KG/M2

## 2023-07-24 DIAGNOSIS — F17.210 NICOTINE DEPENDENCE, CIGARETTES, UNCOMPLICATED: ICD-10-CM

## 2023-07-24 DIAGNOSIS — Z80.1 FAMILY HISTORY OF MALIGNANT NEOPLASM OF TRACHEA, BRONCHUS AND LUNG: ICD-10-CM

## 2023-07-24 DIAGNOSIS — Z80.8 FAMILY HISTORY OF MALIGNANT NEOPLASM OF OTHER ORGANS OR SYSTEMS: ICD-10-CM

## 2023-07-24 DIAGNOSIS — Z80.0 FAMILY HISTORY OF MALIGNANT NEOPLASM OF DIGESTIVE ORGANS: ICD-10-CM

## 2023-07-24 DIAGNOSIS — Z80.52 FAMILY HISTORY OF MALIGNANT NEOPLASM OF BLADDER: ICD-10-CM

## 2023-07-24 DIAGNOSIS — Z78.9 OTHER SPECIFIED HEALTH STATUS: ICD-10-CM

## 2023-07-24 PROCEDURE — 99203 OFFICE O/P NEW LOW 30 MIN: CPT

## 2023-07-24 RX ORDER — PREDNISONE 10 MG/1
10 TABLET ORAL
Qty: 25 | Refills: 0 | Status: COMPLETED | COMMUNITY
Start: 2023-05-26 | End: 2023-07-24

## 2023-07-24 RX ORDER — TOPIRAMATE 25 MG/1
25 TABLET, FILM COATED ORAL DAILY
Qty: 180 | Refills: 0 | Status: COMPLETED | COMMUNITY
Start: 2023-04-28 | End: 2023-07-24

## 2023-07-24 RX ORDER — DOXYCYCLINE HYCLATE 100 MG/1
100 CAPSULE ORAL
Qty: 40 | Refills: 1 | Status: COMPLETED | COMMUNITY
Start: 2022-12-02 | End: 2023-07-24

## 2023-07-24 RX ORDER — BENZONATATE 200 MG/1
200 CAPSULE ORAL 3 TIMES DAILY
Qty: 21 | Refills: 1 | Status: COMPLETED | COMMUNITY
Start: 2022-12-02 | End: 2023-07-24

## 2023-07-24 RX ORDER — METRONIDAZOLE 500 MG/1
500 TABLET ORAL TWICE DAILY
Qty: 14 | Refills: 0 | Status: COMPLETED | COMMUNITY
Start: 2022-01-18 | End: 2023-07-24

## 2023-07-24 RX ORDER — TRIAMCINOLONE ACETONIDE 1 MG/G
0.1 CREAM TOPICAL TWICE DAILY
Qty: 1 | Refills: 1 | Status: COMPLETED | COMMUNITY
Start: 2023-05-26 | End: 2023-07-24

## 2023-07-24 RX ORDER — CEPHALEXIN 500 MG/1
500 CAPSULE ORAL 4 TIMES DAILY
Qty: 28 | Refills: 0 | Status: COMPLETED | COMMUNITY
Start: 2023-05-24 | End: 2023-07-24

## 2023-07-24 RX ORDER — DOXYCYCLINE 100 MG/1
100 CAPSULE ORAL TWICE DAILY
Qty: 20 | Refills: 0 | Status: COMPLETED | COMMUNITY
Start: 2023-06-09 | End: 2023-07-24

## 2023-07-24 RX ORDER — HYDROXYZINE HYDROCHLORIDE 25 MG/1
25 TABLET ORAL 3 TIMES DAILY
Qty: 30 | Refills: 0 | Status: COMPLETED | COMMUNITY
Start: 2023-05-24 | End: 2023-07-24

## 2023-07-24 NOTE — PAST MEDICAL HISTORY
[Menarche Age ____] : age at menarche was [unfilled] [Definite ___ (Date)] : the last menstrual period was [unfilled] [Irregular Cycle Intervals] : are  irregular [Total Preg ___] : G[unfilled] [Living ___] : Living: [unfilled] [Age At Live Birth ___] : Age at live birth: [unfilled] [History of Hormone Replacement Treatment] : has no history of hormone replacement treatment

## 2023-07-24 NOTE — PHYSICAL EXAM
[Bra Size: ___] : Bra Size: [unfilled] [Normocephalic] : normocephalic [Atraumatic] : atraumatic [Supple] : supple [No Supraclavicular Adenopathy] : no supraclavicular adenopathy [No Thyromegaly] : no thyromegaly [Examined in the supine and seated position] : examined in the supine and seated position [No dominant masses] : no dominant masses in right breast  [No dominant masses] : no dominant masses left breast [No Nipple Retraction] : no left nipple retraction [No Axillary Lymphadenopathy] : no left axillary lymphadenopathy [No Edema] : no edema [No Rashes] : no rashes [No Ulceration] : no ulceration [No Swelling] : no swelling [Full ROM] : full range of motion [de-identified] : NO suspicious masses or lesions, u/s findings on right are not palpable. Bilat nipple d/c milky, none bloody with expression, multiduct.

## 2023-07-24 NOTE — REASON FOR VISIT
[Consultation] : a consultation visit [FreeTextEntry1] : discharge from both breasts- multi color-left side had blood discharged

## 2023-07-24 NOTE — HISTORY OF PRESENT ILLNESS
[FreeTextEntry1] : Referred by Soto Johnson MD\par PCP: Chucho Graham MD\par \par Patient is a 32 year old female here today for consultation for bilateral nipple discharge for a few years but recent L bloody nipple discharge with expression only. Pt states she has had years of bilat nipple d/c, white, yellow, multi-colored only with expression.\par She is a smoker.\par Also had recent prolactin and TFT tested with PCP and was negative. \par Pt denies any breast lesions, discharge or masses.\par \par 23 ZP, bilat dMMG: FG. No dominant mass, suspicious microcalcification cluster, or architectural distortion is appreciated. Impression: no mmg abnormality. Rec clinical management. BR1\par \par 23 ZP, bilat US: R- 12:00, 5cmfn, 7 mm hypoechoic nodule. 12:00, 4cmfn, 6 mm complicated cyst. RA 1.2cm hypoechoic nodule. L- No cystic or solid lesion is identified. No abnormal acoustical shadowing is apparent. Impression: Right breast probably benign nodules. Rec: A 6 mos right breast US. Clinical management of nipple discharge rec. BR3\par \par Fhx: Breast: MGA-age unknown, MGGM-age unknown, Stomach: PGGF-55, Bladder: Muncle 60, Lung: MGM-75, Skin-MGF-unknown, Ovarian/cervical-MAunt-not confirmed which one 1/3 40-. Pt's mother is alive but hasn’t seen a doctor in 25 years. Pt is AJ but on father's side.

## 2023-07-24 NOTE — REVIEW OF SYSTEMS
[As Noted in HPI] : as noted in HPI [Limb Pain] : limb pain [Itching] : itching [Anxiety] : anxiety [Depression] : depression [Negative] : Heme/Lymph [FreeTextEntry9] : right shoulder pain [de-identified] : itching, staph, sweats [de-identified] : anxiety, stress

## 2023-07-24 NOTE — ASSESSMENT
[FreeTextEntry1] : Referred by Soto Johnson MD\par PCP: Chucho Graham MD\par \par Patient is a 32 year old female here today for consultation for bilateral nipple discharge for a few years but recent L bloody nipple discharge with expression only. Pt states she has had years of bilat nipple d/c, white, yellow, multi-colored only with expression.\par She is a smoker.\par Also had recent prolactin and TFT tested with PCP and was negative. \par Pt denies any breast lesions, discharge or masses.\par \par 23 ZP, bilat dMMG: FG. No dominant mass, suspicious microcalcification cluster, or architectural distortion is appreciated. Impression: no mmg abnormality. Rec clinical management. BR1\par \par 23 ZP, bilat US: R- 12:00, 5cmfn, 7 mm hypoechoic nodule. 12:00, 4cmfn, 6 mm complicated cyst. RA 1.2cm hypoechoic nodule. L- No cystic or solid lesion is identified. No abnormal acoustical shadowing is apparent. Impression: Right breast probably benign nodules. Rec: A 6 mos right breast US. Clinical management of nipple discharge rec. BR3\par \par Fhx: Breast: MGA-age unknown, MGGM-age unknown, Stomach: PGGF-55, Bladder: Muncle 60, Lung: MGM-75, Skin-MGF-unknown, Ovarian/cervical-MAunt-not confirmed which one 1/3 40-. Pt's mother is alive but hasn’t seen a doctor in 25 years. Pt is AJ but on father's side. \par CBE: 38C, Referred by Soto Johnson MD\par PCP: Chucho Graham MD\par \par Patient is a 32 year old female here today for consultation for bilateral nipple discharge for a few years but recent L bloody nipple discharge with expression only. Pt states she has had years of bilat nipple d/c, white, yellow, multi-colored only with expression.\par She is a smoker.\par Also had recent prolactin and TFT tested with PCP and was negative. \par Pt denies any breast lesions, discharge or masses.\par \par 23 ZP, bilat dMMG: FG. No dominant mass, suspicious microcalcification cluster, or architectural distortion is appreciated. Impression: no mmg abnormality. Rec clinical management. BR1\par \par 23 ZP, bilat US: R- 12:00, 5cmfn, 7 mm hypoechoic nodule. 12:00, 4cmfn, 6 mm complicated cyst. RA 1.2cm hypoechoic nodule. L- No cystic or solid lesion is identified. No abnormal acoustical shadowing is apparent. Impression: Right breast probably benign nodules. Rec: A 6 mos right breast US. Clinical management of nipple discharge rec. BR3\par \par Fhx: Breast: MGA-age unknown, MGGM-age unknown, Stomach: PGGF-55, Bladder: Muncle 60, Lung: MGM-75, Skin-MGF-unknown, Ovarian/cervical-MAunt-not confirmed which one 1/3 40-. Pt's mother is alive but hasn’t seen a doctor in 25 years. Pt is AJ but on father's side. \par CBE: Bilat nipple d/c milky, none bloody with expression, multiduct. no dominant masses in right breast , no dominant masses left breast, no right nipple retraction, no left nipple retraction. No axillary or SC lymphadenopathy. \par Reviewed with pt results of mmg and u/s. Right u/s rec in 6 mos. Discussed the meaning BR3, pt anxious and thinks she would rather have biopsy. She will bring CD's. Also rec to cease expressing nipples. Discussed genetic testing and pt accepts, to schedule appt with NP at same time she drops off the ZP CD for review for bx review, for RA 1.2 cm lesion.

## 2023-07-24 NOTE — DATA REVIEWED
[FreeTextEntry1] : 7/13/23 ZP, bilat dMMG: FG. No dominant mass, suspicious microcalcification cluster, or architectural distortion is appreciated. Impression: no mmg abnormality. Rec clinical management. BR1\par \par 7/13/23 ZP, bilat US: R- 12:00, 5cmfn, 7 mm hypoechoic nodule. 12:00, 4cmfn, 6 mm complicated cyst. RA 1.2cm hypoechoic nodule. L- No cystic or solid lesion is identified. No abnormal acoustical shadowing is apparent. Impression: Right breast probably benign nodules. Rec: A 6 mos right breast US. Clinical management of nipple discharge rec. BR3\par

## 2023-07-27 ENCOUNTER — NON-APPOINTMENT (OUTPATIENT)
Age: 33
End: 2023-07-27

## 2023-07-31 ENCOUNTER — APPOINTMENT (OUTPATIENT)
Dept: ULTRASOUND IMAGING | Facility: CLINIC | Age: 33
End: 2023-07-31
Payer: COMMERCIAL

## 2023-07-31 ENCOUNTER — RESULT REVIEW (OUTPATIENT)
Age: 33
End: 2023-07-31

## 2023-07-31 ENCOUNTER — NON-APPOINTMENT (OUTPATIENT)
Age: 33
End: 2023-07-31

## 2023-07-31 ENCOUNTER — OUTPATIENT (OUTPATIENT)
Dept: OUTPATIENT SERVICES | Facility: HOSPITAL | Age: 33
LOS: 1 days | End: 2023-07-31
Payer: COMMERCIAL

## 2023-07-31 DIAGNOSIS — R93.89 ABNORMAL FINDINGS ON DIAGNOSTIC IMAGING OF OTHER SPECIFIED BODY STRUCTURES: ICD-10-CM

## 2023-07-31 PROCEDURE — 88305 TISSUE EXAM BY PATHOLOGIST: CPT | Mod: 26

## 2023-07-31 PROCEDURE — A4648: CPT

## 2023-07-31 PROCEDURE — 19083 BX BREAST 1ST LESION US IMAG: CPT

## 2023-07-31 PROCEDURE — 19083 BX BREAST 1ST LESION US IMAG: CPT | Mod: RT

## 2023-07-31 PROCEDURE — 77065 DX MAMMO INCL CAD UNI: CPT

## 2023-07-31 PROCEDURE — 88305 TISSUE EXAM BY PATHOLOGIST: CPT

## 2023-07-31 PROCEDURE — 77065 DX MAMMO INCL CAD UNI: CPT | Mod: 26,RT

## 2023-08-01 ENCOUNTER — APPOINTMENT (OUTPATIENT)
Dept: BREAST CENTER | Facility: CLINIC | Age: 33
End: 2023-08-01

## 2023-08-04 ENCOUNTER — APPOINTMENT (OUTPATIENT)
Dept: MAMMOGRAPHY | Facility: CLINIC | Age: 33
End: 2023-08-04

## 2023-08-04 ENCOUNTER — APPOINTMENT (OUTPATIENT)
Dept: ULTRASOUND IMAGING | Facility: CLINIC | Age: 33
End: 2023-08-04

## 2023-08-07 ENCOUNTER — APPOINTMENT (OUTPATIENT)
Dept: OBGYN | Facility: CLINIC | Age: 33
End: 2023-08-07
Payer: COMMERCIAL

## 2023-08-07 ENCOUNTER — APPOINTMENT (OUTPATIENT)
Dept: ANTEPARTUM | Facility: CLINIC | Age: 33
End: 2023-08-07
Payer: COMMERCIAL

## 2023-08-07 ENCOUNTER — ASOB RESULT (OUTPATIENT)
Age: 33
End: 2023-08-07

## 2023-08-07 VITALS
BODY MASS INDEX: 32.8 KG/M2 | DIASTOLIC BLOOD PRESSURE: 70 MMHG | HEIGHT: 67 IN | SYSTOLIC BLOOD PRESSURE: 120 MMHG | WEIGHT: 209 LBS

## 2023-08-07 DIAGNOSIS — N92.0 EXCESSIVE AND FREQUENT MENSTRUATION WITH REGULAR CYCLE: ICD-10-CM

## 2023-08-07 PROCEDURE — 76830 TRANSVAGINAL US NON-OB: CPT

## 2023-08-07 PROCEDURE — 76856 US EXAM PELVIC COMPLETE: CPT | Mod: 59

## 2023-08-07 PROCEDURE — 99213 OFFICE O/P EST LOW 20 MIN: CPT

## 2023-08-09 ENCOUNTER — NON-APPOINTMENT (OUTPATIENT)
Age: 33
End: 2023-08-09

## 2023-08-15 ENCOUNTER — APPOINTMENT (OUTPATIENT)
Dept: BREAST CENTER | Facility: CLINIC | Age: 33
End: 2023-08-15
Payer: COMMERCIAL

## 2023-08-15 VITALS
DIASTOLIC BLOOD PRESSURE: 75 MMHG | BODY MASS INDEX: 32.8 KG/M2 | TEMPERATURE: 97.3 F | HEART RATE: 112 BPM | HEIGHT: 67 IN | SYSTOLIC BLOOD PRESSURE: 113 MMHG | WEIGHT: 209 LBS

## 2023-08-15 PROCEDURE — 99213 OFFICE O/P EST LOW 20 MIN: CPT

## 2023-08-15 NOTE — ASSESSMENT
[FreeTextEntry1] : PCP: Chucho Graham MD  Patient is a 32-year-old female here today for genetic testing. She was seen for consult last month for bilateral nipple discharge for a few years but recent L bloody nipple discharge with expression only. Pt states she has had years of bilat nipple d/c, white, yellow, multi-colored only with expression. Patient had recent prolactin and TFT tested with PCP and was negative. Of note she is a smoker.  Pt denies any breast lesions, discharge or masses. She underwent R US guided biopsy last month, benign and concordant.   23 ZP, bilat dMMG: FG. No dominant mass, suspicious microcalcification cluster, or architectural distortion is appreciated. Impression: no mmg abnormality. Rec clinical management. BR1  23 ZP, bilat US: R- 12:00, 5cmfn, 7 mm hypoechoic nodule. 12:00, 4cmfn, 6 mm complicated cyst. RA 1.2cm hypoechoic nodule. L- No cystic or solid lesion is identified. No abnormal acoustical shadowing is apparent. Impression: Right breast probably benign nodules. Rec: A 6 mos right breast US. Clinical management of nipple discharge rec. BR3  23 GSB, R RA US core bx path: Fibroadenomatoid nodules, focal adenosis and apocrine change. Benign and Concordant. Rec US in 6 mos.  Fhx: Breast: MGA-age unknown, MGGM-age unknown, Stomach: PGGF-55, Bladder: Muncle 60, Lung: MGM-75, Skin-MGF-unknown, Ovarian-MAunt (1/3) 40-. Pt's mother is alive but hasn't seen a doctor in 25 years. Pt is AJ but on father's side.  Physical exam deferred today as she is here for genetic testing and recent benign biopsy. Genetic testing offered given family history of ovarian and breast cancer. Patient also AJ on father's side. Patient therefore meets NCCN guidelines. Informed consent obtained. [employer] serum testing sent. Further management pending results.

## 2023-08-15 NOTE — HISTORY OF PRESENT ILLNESS
[FreeTextEntry1] : PCP: Chucho Graham MD  Patient is a 32-year-old female here today for genetic testing. She was seen for consult last monthfor bilateral nipple discharge for a few years but recent L bloody nipple discharge with expression only. Pt states she has had years of bilat nipple d/c, white, yellow, multi-colored only with expression. Patient had recent prolactin and TFT tested with PCP and was negative. Of note she is a smoker.  Pt denies any breast lesions, discharge or masses. She underwent R US guided biopsy last month, benign and concordant.   23 ZP, bilat dMMG: FG. No dominant mass, suspicious microcalcification cluster, or architectural distortion is appreciated. Impression: no mmg abnormality. Rec clinical management. BR1  23 ZP, bilat US: R- 12:00, 5cmfn, 7 mm hypoechoic nodule. 12:00, 4cmfn, 6 mm complicated cyst. RA 1.2cm hypoechoic nodule. L- No cystic or solid lesion is identified. No abnormal acoustical shadowing is apparent. Impression: Right breast probably benign nodules. Rec: A 6 mos right breast US. Clinical management of nipple discharge rec. BR3  23 GSB, R RA US core bx path: Fibroadenomatoid nodules, focal adenosis and apocrine change. Benign and Concordant. Rec US in 6 mos.  Fhx: Breast: MGA-age unknown, MGGM-age unknown, Stomach: PGGF-55, Bladder: Muncle 60, Lung: MGM-75, Skin-MGF-unknown, Ovarian-MAunt (1/3) 40-. Pt's mother is alive but hasn't seen a doctor in 25 years. Pt is AJ but on father's side.

## 2023-08-15 NOTE — DATA REVIEWED
[FreeTextEntry1] :  7/31/23 GSB, R RA US core bx path: Fibroadenomatoid nodules, focal adenosis and apocrine change. Benign and Concordant. Rec US in 6 mos.

## 2023-08-23 ENCOUNTER — APPOINTMENT (OUTPATIENT)
Dept: BREAST CENTER | Facility: CLINIC | Age: 33
End: 2023-08-23
Payer: COMMERCIAL

## 2023-08-23 VITALS
BODY MASS INDEX: 32.8 KG/M2 | DIASTOLIC BLOOD PRESSURE: 79 MMHG | HEART RATE: 90 BPM | TEMPERATURE: 97.3 F | SYSTOLIC BLOOD PRESSURE: 116 MMHG | HEIGHT: 67 IN | WEIGHT: 209 LBS

## 2023-08-23 DIAGNOSIS — Z80.41 FAMILY HISTORY OF MALIGNANT NEOPLASM OF OVARY: ICD-10-CM

## 2023-08-23 DIAGNOSIS — Z80.3 FAMILY HISTORY OF MALIGNANT NEOPLASM OF BREAST: ICD-10-CM

## 2023-08-23 DIAGNOSIS — N64.52 NIPPLE DISCHARGE: ICD-10-CM

## 2023-08-23 DIAGNOSIS — R93.89 ABNORMAL FINDINGS ON DIAGNOSTIC IMAGING OF OTHER SPECIFIED BODY STRUCTURES: ICD-10-CM

## 2023-08-23 PROCEDURE — 99214 OFFICE O/P EST MOD 30 MIN: CPT

## 2023-08-23 NOTE — ASSESSMENT
[FreeTextEntry1] : PCP: Chucho Graham MD  Patient is a 32-year-old female s/p biopsy on Right 23, here c/o right breast spontaneous bloody nipple d/c since last week.    Genetic testing pending.  She was seen for consult last month for bilateral nipple discharge for a few years and recent L bloody nipple discharge with expression only, but no longer present and was negative on my CBE of 23 for bloody nipple d/c. Only had bilat multiduct milky d/c.  Pt states she has had years of bilat nipple d/c, white, yellow, multi-colored only with expression. Patient had recent prolactin and TFT tested with PCP and was negative. Of note she is a smoker. Pt denies any breast lesions, discharge or masses. She underwent R US guided biopsy last month, benign and concordant.  23 ZP, bilat dMMG: FG. No dominant mass, suspicious microcalcification cluster, or architectural distortion is appreciated. Impression: no mmg abnormality. Rec clinical management. BR1  23 ZP, bilat US: R- 12:00, 5cmfn, 7 mm hypoechoic nodule. 12:00, 4cmfn, 6 mm complicated cyst. RA 1.2cm hypoechoic nodule. L- No cystic or solid lesion is identified. No abnormal acoustical shadowing is apparent. Impression: Right breast probably benign nodules. Rec: A 6 mos right breast US. Clinical management of nipple discharge rec. BR3  23 GSB, R RA US core bx path: Fibroadenomatoid nodules, focal adenosis and apocrine change. Benign and Concordant. Rec US in 6 mos.  Fhx: Breast: MGA-age unknown, MGGM-age unknown, Stomach: PGGF-55, Bladder: Muncle 60, Lung: MGM-75, Skin-MGF-unknown, Ovarian-MAunt (1/3) 40-. Pt's mother is alive but hasn't seen a doctor in 25 years. Pt is AJ but on father's side.  CBE:  38C, No suspicious masses. Bilat multiduct milky d/c. Right 2 ducts of dark bloody d/c-guiac positive. Left, no bloody d/c. No associated masses or signs of cellulitis  Reviewed recent path with ptsri, rec is for 6 mos f.u u/s due . Also discussed the bloody d/c on right is 2 separate ducts and may be due to trauma from the recent core bx. Rec cessation of expression, allow healing if due to trauma. F.u 4-6 weeks. If persists, can consider MRI, Genetic test results pending, and/or NAC exploration and excision if symptoms persist.   PLAN: F.u 4-6 weeks, cessation of expression Genetic test pending Possible MRI vs NAC exploration.

## 2023-08-23 NOTE — CONSULT LETTER
[Dear  ___] : Dear  [unfilled], [Courtesy Letter:] : I had the pleasure of seeing your patient, [unfilled], in my office today. [Please see my note below.] : Please see my note below. [Consult Closing:] : Thank you very much for allowing me to participate in the care of this patient.  If you have any questions, please do not hesitate to contact me. [Sincerely,] : Sincerely, [FreeTextEntry3] : Raya Keith MD

## 2023-08-23 NOTE — DATA REVIEWED
[FreeTextEntry1] : 7/13/23 ZP, bilat dMMG: FG. No dominant mass, suspicious microcalcification cluster, or architectural distortion is appreciated. Impression: no mmg abnormality. Rec clinical management. BR1  7/13/23 ZP, bilat US: R- 12:00, 5cmfn, 7 mm hypoechoic nodule. 12:00, 4cmfn, 6 mm complicated cyst. RA 1.2cm hypoechoic nodule. L- No cystic or solid lesion is identified. No abnormal acoustical shadowing is apparent. Impression: Right breast probably benign nodules. Rec: A 6 mos right breast US. Clinical management of nipple discharge rec. BR3  7/31/23 GSB, R RA US core bx path: Fibroadenomatoid nodules, focal adenosis and apocrine change. Benign and Concordant. Rec US in 6 mos.

## 2023-08-23 NOTE — HISTORY OF PRESENT ILLNESS
[FreeTextEntry1] : PCP: Chucho Graham MD  Patient is a 32-year-old female s/p biopsy on Right 23, here c/o right breast spontaneous bloody nipple d/c since last week.    Genetic testing pending.  She was seen for consult last month for bilateral nipple discharge for a few years and recent L bloody nipple discharge with expression only, but no longer present and was negative on my CBE of 23 for bloody nipple d/c. Only had bilat multiduct milky d/c.  Pt states she has had years of bilat nipple d/c, white, yellow, multi-colored only with expression. Patient had recent prolactin and TFT tested with PCP and was negative. Of note she is a smoker. Pt denies any breast lesions, discharge or masses. She underwent R US guided biopsy last month, benign and concordant.  23 ZP, bilat dMMG: FG. No dominant mass, suspicious microcalcification cluster, or architectural distortion is appreciated. Impression: no mmg abnormality. Rec clinical management. BR1  23 ZP, bilat US: R- 12:00, 5cmfn, 7 mm hypoechoic nodule. 12:00, 4cmfn, 6 mm complicated cyst. RA 1.2cm hypoechoic nodule. L- No cystic or solid lesion is identified. No abnormal acoustical shadowing is apparent. Impression: Right breast probably benign nodules. Rec: A 6 mos right breast US. Clinical management of nipple discharge rec. BR3  23 GSB, R RA US core bx path: Fibroadenomatoid nodules, focal adenosis and apocrine change. Benign and Concordant. Rec US in 6 mos.  Fhx: Breast: MGA-age unknown, MGGM-age unknown, Stomach: PGGF-55, Bladder: Muncle 60, Lung: MGM-75, Skin-MGF-unknown, Ovarian-MAunt (1/3) 40-. Pt's mother is alive but hasn't seen a doctor in 25 years. Pt is AJ but on father's side.

## 2023-08-23 NOTE — PHYSICAL EXAM
[Normocephalic] : normocephalic [Atraumatic] : atraumatic [Supple] : supple [No Supraclavicular Adenopathy] : no supraclavicular adenopathy [Examined in the supine and seated position] : examined in the supine and seated position [Symmetrical] : symmetrical [Bra Size: ___] : Bra Size: [unfilled] [No dominant masses] : no dominant masses in right breast  [No dominant masses] : no dominant masses left breast [No Nipple Retraction] : no left nipple retraction [No Axillary Lymphadenopathy] : no left axillary lymphadenopathy [No Edema] : no edema [No Rashes] : no rashes [No Ulceration] : no ulceration [de-identified] : Bilat multiduct milky d/c. Right 2 ducts of dark bloody d/c. Left, no bloody d/c. No associated masses or signs of cellulitis

## 2023-08-30 ENCOUNTER — NON-APPOINTMENT (OUTPATIENT)
Age: 33
End: 2023-08-30

## 2023-09-07 ENCOUNTER — TRANSCRIPTION ENCOUNTER (OUTPATIENT)
Age: 33
End: 2023-09-07

## 2023-10-05 ENCOUNTER — APPOINTMENT (OUTPATIENT)
Dept: FAMILY MEDICINE | Facility: CLINIC | Age: 33
End: 2023-10-05
Payer: COMMERCIAL

## 2023-10-05 VITALS
BODY MASS INDEX: 31.57 KG/M2 | HEIGHT: 67 IN | DIASTOLIC BLOOD PRESSURE: 88 MMHG | TEMPERATURE: 98.6 F | OXYGEN SATURATION: 99 % | HEART RATE: 103 BPM | SYSTOLIC BLOOD PRESSURE: 118 MMHG | WEIGHT: 201.13 LBS

## 2023-10-05 PROCEDURE — 99213 OFFICE O/P EST LOW 20 MIN: CPT

## 2023-10-18 ENCOUNTER — APPOINTMENT (OUTPATIENT)
Dept: SURGERY | Facility: CLINIC | Age: 33
End: 2023-10-18

## 2023-10-24 ENCOUNTER — TRANSCRIPTION ENCOUNTER (OUTPATIENT)
Age: 33
End: 2023-10-24

## 2023-10-27 ENCOUNTER — TRANSCRIPTION ENCOUNTER (OUTPATIENT)
Age: 33
End: 2023-10-27

## 2024-01-13 ENCOUNTER — TRANSCRIPTION ENCOUNTER (OUTPATIENT)
Age: 34
End: 2024-01-13

## 2024-01-15 ENCOUNTER — TRANSCRIPTION ENCOUNTER (OUTPATIENT)
Age: 34
End: 2024-01-15

## 2024-01-19 ENCOUNTER — TRANSCRIPTION ENCOUNTER (OUTPATIENT)
Age: 34
End: 2024-01-19

## 2024-02-05 ENCOUNTER — APPOINTMENT (OUTPATIENT)
Dept: ULTRASOUND IMAGING | Facility: CLINIC | Age: 34
End: 2024-02-05

## 2024-02-06 ENCOUNTER — TRANSCRIPTION ENCOUNTER (OUTPATIENT)
Age: 34
End: 2024-02-06

## 2024-02-15 ENCOUNTER — APPOINTMENT (OUTPATIENT)
Dept: BREAST CENTER | Facility: CLINIC | Age: 34
End: 2024-02-15
Payer: COMMERCIAL

## 2024-02-15 VITALS
WEIGHT: 210 LBS | HEART RATE: 101 BPM | BODY MASS INDEX: 32.96 KG/M2 | HEIGHT: 67 IN | DIASTOLIC BLOOD PRESSURE: 50 MMHG | SYSTOLIC BLOOD PRESSURE: 100 MMHG | OXYGEN SATURATION: 98 % | TEMPERATURE: 98.1 F

## 2024-02-15 DIAGNOSIS — R92.8 OTHER ABNORMAL AND INCONCLUSIVE FINDINGS ON DIAGNOSTIC IMAGING OF BREAST: ICD-10-CM

## 2024-02-15 PROCEDURE — 99213 OFFICE O/P EST LOW 20 MIN: CPT

## 2024-02-15 RX ORDER — TIRZEPATIDE 2.5 MG/.5ML
2.5 INJECTION, SOLUTION SUBCUTANEOUS
Qty: 1 | Refills: 0 | Status: COMPLETED | COMMUNITY
Start: 2024-01-13 | End: 2024-02-15

## 2024-02-16 NOTE — DATA REVIEWED
[FreeTextEntry1] : 2/6/24 ZP, R breast US: RA 1.3x0.7x1.1cm hypoechoic nodule which has not significantly changed.  12:00 5cmfn 0.6x0.4x0.5cm hypoechoic nodule which has not significantly changed. 12:00 4cmfn 0.6x0.2x0.5cm hypoechoic nodule which has not significantly changed.  11:00 5cmfn 0.6x0.2x0.5cm apocrine metaplasia which was not previously seen. Impression: Probably benign breast US. Rec R US in 6 mos. BR3

## 2024-02-16 NOTE — ASSESSMENT
[FreeTextEntry1] : PCP: Chucho Graham MD  Patient is a 33-year-old, Myriad negative, female here today for follow up after recent US. Last visit she was seen for R spontaneous multi duct bloody nipple discharge that occurred after breast biopsy. Bloody nipple discharge has since stopped. She notes left breast with intermittent spontaneous milky/green nipple discharge and right breast with intermittent spontaneous milky nipple discharge have been ongoing for years. She had workup including prolactin levels with GYN, all wnl last summer. Her youngest child is 20 months old.   Pt denies any breast lesions, discharge or masses. She does note intermittent breast pain and she is a current smoker.   23 ZP, bilat dMMG: FG. No dominant mass, suspicious microcalcification cluster, or architectural distortion is appreciated. Impression: no mmg abnormality. Rec clinical management. BR1  23 ZP, bilat US: R- 12:00, 5cmfn, 7 mm hypoechoic nodule. 12:00, 4cmfn, 6 mm complicated cyst. RA 1.2cm hypoechoic nodule. L- No cystic or solid lesion is identified. No abnormal acoustical shadowing is apparent. Impression: Right breast probably benign nodules. Rec: A 6 mos right breast US. Clinical management of nipple discharge rec. BR3  23 GSB, R RA US core bx path: Fibroadenomatoid nodules, focal adenosis and apocrine change. Benign and Concordant. Rec US in 6 mos.  24 ZP, R breast US: RA 1.3x0.7x1.1cm hypoechoic nodule which has not significantly changed.  12:00 5cmfn 0.6x0.4x0.5cm hypoechoic nodule which has not significantly changed. 12:00 4cmfn 0.6x0.2x0.5cm hypoechoic nodule which has not significantly changed.  11:00 5cmfn 0.6x0.2x0.5cm apocrine metaplasia which was not previously seen. Impression: Probably benign breast US. Rec R US in 6 mos. BR3  Fhx: Breast: MGA-age unknown, MGGM-age unknown, Stomach: PGGF-55, Bladder: Muncle 60, Lung: MGM-75, Skin-MGF-unknown, Ovarian-MAunt (1/3) 40-. Pt's mother is alive but hasn't seen a doctor in 25 years. Pt is AJ but on father's side.  CBE: 38C, BALTA bilaterally. No discrete masses. No discharge elicited on today's exam. No axillary or SC adenopathy. Reviewed CBE with patient. She notes she went to  for recent study as she mixed up the date of her [facility] appointment and was then not able to reschedule in a timely manner. Will have to have outside studies reviewed especially given new finding at 11:00. Further management pending review.

## 2024-02-16 NOTE — HISTORY OF PRESENT ILLNESS
[FreeTextEntry1] : PCP: Chucho Graham MD  Patient is a 33-year-old, Myriad negative, female here today for follow up after recent US. Last visit she was seen for R spontaneous multi duct bloody nipple discharge that occurred after breast biopsy. Bloody nipple discharge has since stopped. She notes left breast with intermittent spontaneous milky/green nipple discharge and right breast with intermittent spontaneous milky nipple discharge have been ongoing for years. She had workup including prolactin levels with GYN, all wnl last summer. Her youngest child is 20 months old.   Pt denies any breast lesions, discharge or masses. She does note intermittent breast pain and she is a current smoker.   23 ZP, bilat dMMG: FG. No dominant mass, suspicious microcalcification cluster, or architectural distortion is appreciated. Impression: no mmg abnormality. Rec clinical management. BR1  23 ZP, bilat US: R- 12:00, 5cmfn, 7 mm hypoechoic nodule. 12:00, 4cmfn, 6 mm complicated cyst. RA 1.2cm hypoechoic nodule. L- No cystic or solid lesion is identified. No abnormal acoustical shadowing is apparent. Impression: Right breast probably benign nodules. Rec: A 6 mos right breast US. Clinical management of nipple discharge rec. BR3  23 GSB, R RA US core bx path: Fibroadenomatoid nodules, focal adenosis and apocrine change. Benign and Concordant. Rec US in 6 mos.  24 ZP, R breast US: RA 1.3x0.7x1.1cm hypoechoic nodule which has not significantly changed.  12:00 5cmfn 0.6x0.4x0.5cm hypoechoic nodule which has not significantly changed. 12:00 4cmfn 0.6x0.2x0.5cm hypoechoic nodule which has not significantly changed.  11:00 5cmfn 0.6x0.2x0.5cm apocrine metaplasia which was not previously seen. Impression: Probably benign breast US. Rec R US in 6 mos. BR3  Fhx: Breast: MGA-age unknown, MGGM-age unknown, Stomach: PGGF-55, Bladder: Muncle 60, Lung: MGM-75, Skin-MGF-unknown, Ovarian-MAunt (1/3) 40-. Pt's mother is alive but hasn't seen a doctor in 25 years. Pt is AJ but on father's side.

## 2024-02-16 NOTE — PHYSICAL EXAM
[Normocephalic] : normocephalic [Atraumatic] : atraumatic [Supple] : supple [No Supraclavicular Adenopathy] : no supraclavicular adenopathy [Examined in the supine and seated position] : examined in the supine and seated position [Symmetrical] : symmetrical [Bra Size: ___] : Bra Size: [unfilled] [No dominant masses] : no dominant masses in right breast  [No dominant masses] : no dominant masses left breast [No Nipple Retraction] : no left nipple retraction [No Nipple Discharge] : no left nipple discharge [No Axillary Lymphadenopathy] : no left axillary lymphadenopathy [No Edema] : no edema [No Swelling] : no swelling [Full ROM] : full range of motion [No Rashes] : no rashes [No Ulceration] : no ulceration [TextEntry] : Psych: appropriate, A&Ox3 Neuro: intact grossly, gait normal

## 2024-02-16 NOTE — ADDENDUM
[FreeTextEntry1] : Outside studies reviewed by [facility] radiology. Right 11 N5 finding was probably present 7/13/23 and previously annotated at 12 N5, agree with recommendation for 6 mos f/u, US order place. Patient made aware of recommendation and states she will schedule at [facility]. Of note ZP report notes left bloody nipple discharge, patient states she never had left bloody nipple discharge, only right sided s/p biopsy which has since resolved.

## 2024-05-17 ENCOUNTER — APPOINTMENT (OUTPATIENT)
Dept: FAMILY MEDICINE | Facility: CLINIC | Age: 34
End: 2024-05-17
Payer: COMMERCIAL

## 2024-05-17 VITALS
SYSTOLIC BLOOD PRESSURE: 119 MMHG | WEIGHT: 200 LBS | HEART RATE: 111 BPM | BODY MASS INDEX: 31.39 KG/M2 | OXYGEN SATURATION: 98 % | HEIGHT: 67 IN | DIASTOLIC BLOOD PRESSURE: 70 MMHG | TEMPERATURE: 99.4 F

## 2024-05-17 DIAGNOSIS — Z13.6 ENCOUNTER FOR SCREENING FOR LIPOID DISORDERS: ICD-10-CM

## 2024-05-17 DIAGNOSIS — Z13.21 ENCOUNTER FOR SCREENING FOR NUTRITIONAL DISORDER: ICD-10-CM

## 2024-05-17 DIAGNOSIS — Z00.00 ENCOUNTER FOR GENERAL ADULT MEDICAL EXAMINATION W/OUT ABNORMAL FINDINGS: ICD-10-CM

## 2024-05-17 DIAGNOSIS — Z13.29 ENCOUNTER FOR SCREENING FOR OTHER SUSPECTED ENDOCRINE DISORDER: ICD-10-CM

## 2024-05-17 DIAGNOSIS — E11.65 TYPE 2 DIABETES MELLITUS WITH HYPERGLYCEMIA: ICD-10-CM

## 2024-05-17 DIAGNOSIS — E78.2 MIXED HYPERLIPIDEMIA: ICD-10-CM

## 2024-05-17 DIAGNOSIS — Z13.0 ENCOUNTER FOR SCREENING FOR OTHER SUSPECTED ENDOCRINE DISORDER: ICD-10-CM

## 2024-05-17 DIAGNOSIS — D64.9 ANEMIA, UNSPECIFIED: ICD-10-CM

## 2024-05-17 DIAGNOSIS — Z13.228 ENCOUNTER FOR SCREENING FOR OTHER METABOLIC DISORDERS: ICD-10-CM

## 2024-05-17 DIAGNOSIS — Z13.220 ENCOUNTER FOR SCREENING FOR LIPOID DISORDERS: ICD-10-CM

## 2024-05-17 DIAGNOSIS — Z13.228 ENCOUNTER FOR SCREENING FOR OTHER SUSPECTED ENDOCRINE DISORDER: ICD-10-CM

## 2024-05-17 LAB
BILIRUB UR QL STRIP: NEGATIVE
CLARITY UR: CLEAR
COLLECTION METHOD: NORMAL
GLUCOSE UR-MCNC: NEGATIVE
HCG UR QL: 0.2 EU/DL
HGB UR QL STRIP.AUTO: NEGATIVE
KETONES UR-MCNC: NEGATIVE
LEUKOCYTE ESTERASE UR QL STRIP: NEGATIVE
NITRITE UR QL STRIP: NEGATIVE
PH UR STRIP: 6.5
PROT UR STRIP-MCNC: NEGATIVE
SP GR UR STRIP: <=1.005

## 2024-05-17 PROCEDURE — 81003 URINALYSIS AUTO W/O SCOPE: CPT | Mod: QW

## 2024-05-17 PROCEDURE — G0444 DEPRESSION SCREEN ANNUAL: CPT | Mod: 59

## 2024-05-17 PROCEDURE — 99395 PREV VISIT EST AGE 18-39: CPT

## 2024-05-17 PROCEDURE — 36415 COLL VENOUS BLD VENIPUNCTURE: CPT

## 2024-05-17 RX ORDER — SEMAGLUTIDE 2.68 MG/ML
8 INJECTION, SOLUTION SUBCUTANEOUS
Qty: 3 | Refills: 2 | Status: ACTIVE | COMMUNITY
Start: 2023-07-11 | End: 1900-01-01

## 2024-05-17 NOTE — HISTORY OF PRESENT ILLNESS
[FreeTextEntry1] : annual physical [de-identified] : Patient presents for annual physical. has a 2 year old.  Sees psychiatry for anxiety and depression.

## 2024-05-17 NOTE — HEALTH RISK ASSESSMENT
[Good] : ~his/her~ current health as good [Very Good] : ~his/her~  mood as very good [FreeTextEntry1] : none [Never (0 pts)] : Never (0 points) [No] : In the past 12 months have you used drugs other than those required for medical reasons? No [No falls in past year] : Patient reported no falls in the past year [0] : 2) Feeling down, depressed, or hopeless: Not at all (0) [PHQ-2 Negative - No further assessment needed] : PHQ-2 Negative - No further assessment needed [de-identified] : none.  [de-identified] : average  [ECE3Gyfvz] : 0 [Current] : Current [5-9] : 5-9 [Patient reported mammogram was abnormal] : Patient reported mammogram was abnormal [Patient reported PAP Smear was normal] : Patient reported PAP Smear was normal [With Family] : lives with family [# of Members in Household ___] :  household currently consist of [unfilled] member(s) [Employed] : employed [] :  [# Of Children ___] : has [unfilled] children [Sexually Active] : sexually active [Feels Safe at Home] : Feels safe at home [Fully functional (bathing, dressing, toileting, transferring, walking, feeding)] : Fully functional (bathing, dressing, toileting, transferring, walking, feeding) [Fully functional (using the telephone, shopping, preparing meals, housekeeping, doing laundry, using] : Fully functional and needs no help or supervision to perform IADLs (using the telephone, shopping, preparing meals, housekeeping, doing laundry, using transportation, managing medications and managing finances) [Reports changes in hearing] : Reports no changes in hearing [Reports changes in vision] : Reports no changes in vision [MammogramDate] : 01/24 [MammogramComments] : repeat in october [PapSmearDate] : 07/23 [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

## 2024-05-22 DIAGNOSIS — E55.9 VITAMIN D DEFICIENCY, UNSPECIFIED: ICD-10-CM

## 2024-05-22 DIAGNOSIS — E61.1 IRON DEFICIENCY: ICD-10-CM

## 2024-05-22 LAB
25(OH)D3 SERPL-MCNC: 18.7 NG/ML
ALBUMIN SERPL ELPH-MCNC: 4.9 G/DL
ALP BLD-CCNC: 65 U/L
ALT SERPL-CCNC: 8 U/L
ANION GAP SERPL CALC-SCNC: 12 MMOL/L
AST SERPL-CCNC: 12 U/L
BASOPHILS # BLD AUTO: 0.02 K/UL
BASOPHILS NFR BLD AUTO: 0.3 %
BILIRUB SERPL-MCNC: 0.3 MG/DL
BUN SERPL-MCNC: 8 MG/DL
CALCIUM SERPL-MCNC: 9.8 MG/DL
CHLORIDE SERPL-SCNC: 104 MMOL/L
CO2 SERPL-SCNC: 21 MMOL/L
CREAT SERPL-MCNC: 0.81 MG/DL
EGFR: 98 ML/MIN/1.73M2
EOSINOPHIL # BLD AUTO: 0.18 K/UL
EOSINOPHIL NFR BLD AUTO: 2.4 %
ESTIMATED AVERAGE GLUCOSE: 97 MG/DL
FERRITIN SERPL-MCNC: 13 NG/ML
FOLATE SERPL-MCNC: 6.3 NG/ML
GLUCOSE SERPL-MCNC: 101 MG/DL
HBA1C MFR BLD HPLC: 5 %
HCT VFR BLD CALC: 39.7 %
HGB BLD-MCNC: 13.1 G/DL
IMM GRANULOCYTES NFR BLD AUTO: 0.4 %
IRON SATN MFR SERPL: 21 %
IRON SERPL-MCNC: 81 UG/DL
LYMPHOCYTES # BLD AUTO: 1.62 K/UL
LYMPHOCYTES NFR BLD AUTO: 21.3 %
MAN DIFF?: NORMAL
MCHC RBC-ENTMCNC: 29.8 PG
MCHC RBC-ENTMCNC: 33 GM/DL
MCV RBC AUTO: 90.2 FL
MONOCYTES # BLD AUTO: 0.49 K/UL
MONOCYTES NFR BLD AUTO: 6.4 %
NEUTROPHILS # BLD AUTO: 5.28 K/UL
NEUTROPHILS NFR BLD AUTO: 69.2 %
PLATELET # BLD AUTO: 269 K/UL
POTASSIUM SERPL-SCNC: 4.5 MMOL/L
PROT SERPL-MCNC: 7.1 G/DL
RBC # BLD: 4.4 M/UL
RBC # FLD: 12.8 %
SODIUM SERPL-SCNC: 137 MMOL/L
TIBC SERPL-MCNC: 385 UG/DL
TRANSFERRIN SERPL-MCNC: 297 MG/DL
TSH SERPL-ACNC: 1.55 UIU/ML
UIBC SERPL-MCNC: 304 UG/DL
VIT B12 SERPL-MCNC: 326 PG/ML
WBC # FLD AUTO: 7.62 K/UL

## 2024-05-22 RX ORDER — CHLORHEXIDINE GLUCONATE 4 %
325 (65 FE) LIQUID (ML) TOPICAL
Qty: 90 | Refills: 0 | Status: ACTIVE | COMMUNITY
Start: 2024-05-22 | End: 1900-01-01

## 2024-05-22 RX ORDER — ERGOCALCIFEROL 1.25 MG/1
1.25 MG CAPSULE ORAL
Qty: 13 | Refills: 0 | Status: ACTIVE | COMMUNITY
Start: 2024-05-22 | End: 1900-01-01

## 2024-05-23 PROBLEM — Z13.220 ENCOUNTER FOR LIPID SCREENING FOR CARDIOVASCULAR DISEASE: Status: ACTIVE | Noted: 2024-05-23 | Resolved: 2024-06-06

## 2024-05-29 ENCOUNTER — TRANSCRIPTION ENCOUNTER (OUTPATIENT)
Age: 34
End: 2024-05-29

## 2024-05-29 LAB
CHOLEST SERPL-MCNC: 164 MG/DL
HDLC SERPL-MCNC: 32 MG/DL
LDLC SERPL CALC-MCNC: 114 MG/DL
NONHDLC SERPL-MCNC: 132 MG/DL
TRIGL SERPL-MCNC: 96 MG/DL

## 2024-09-04 ENCOUNTER — TRANSCRIPTION ENCOUNTER (OUTPATIENT)
Age: 34
End: 2024-09-04

## 2024-09-05 ENCOUNTER — TRANSCRIPTION ENCOUNTER (OUTPATIENT)
Age: 34
End: 2024-09-05